# Patient Record
Sex: MALE | Race: WHITE | Employment: OTHER | URBAN - METROPOLITAN AREA
[De-identification: names, ages, dates, MRNs, and addresses within clinical notes are randomized per-mention and may not be internally consistent; named-entity substitution may affect disease eponyms.]

---

## 2017-03-01 ENCOUNTER — OFFICE VISIT (OUTPATIENT)
Dept: ORTHOPEDIC SURGERY | Age: 67
End: 2017-03-01

## 2017-03-01 VITALS — BODY MASS INDEX: 28 KG/M2 | HEIGHT: 71 IN | WEIGHT: 200 LBS | RESPIRATION RATE: 16 BRPM

## 2017-03-01 DIAGNOSIS — M65.341 TRIGGER RING FINGER OF RIGHT HAND: Primary | ICD-10-CM

## 2017-03-01 PROCEDURE — 99214 OFFICE O/P EST MOD 30 MIN: CPT | Performed by: ORTHOPAEDIC SURGERY

## 2017-03-01 PROCEDURE — 20550 NJX 1 TENDON SHEATH/LIGAMENT: CPT | Performed by: ORTHOPAEDIC SURGERY

## 2017-03-01 RX ORDER — NAPROXEN 500 MG/1
500 TABLET ORAL 2 TIMES DAILY WITH MEALS
COMMUNITY
End: 2019-08-20

## 2017-03-01 RX ORDER — LIDOCAINE 50 MG/G
OINTMENT TOPICAL PRN
COMMUNITY

## 2017-03-01 RX ORDER — FINASTERIDE 5 MG/1
5 TABLET, FILM COATED ORAL DAILY
COMMUNITY

## 2017-03-01 RX ORDER — HYDROXYZINE PAMOATE 25 MG/1
25 CAPSULE ORAL 3 TIMES DAILY PRN
COMMUNITY

## 2017-04-03 ENCOUNTER — HOSPITAL ENCOUNTER (OUTPATIENT)
Dept: CT IMAGING | Facility: MEDICAL CENTER | Age: 67
Discharge: HOME | End: 2017-04-03
Attending: NEUROLOGICAL SURGERY | Admitting: NEUROLOGICAL SURGERY

## 2017-04-03 DIAGNOSIS — Q67.5: ICD-10-CM

## 2017-04-03 DIAGNOSIS — Q67.5 CONGENITAL DEFORMITY OF SPINE: ICD-10-CM

## 2019-08-20 ENCOUNTER — OFFICE VISIT (OUTPATIENT)
Dept: ORTHOPEDIC SURGERY | Age: 69
End: 2019-08-20
Payer: COMMERCIAL

## 2019-08-20 VITALS — BODY MASS INDEX: 28.7 KG/M2 | RESPIRATION RATE: 16 BRPM | HEIGHT: 71 IN | WEIGHT: 205 LBS

## 2019-08-20 DIAGNOSIS — M65.341 TRIGGER RING FINGER OF RIGHT HAND: Primary | ICD-10-CM

## 2019-08-20 PROCEDURE — 99213 OFFICE O/P EST LOW 20 MIN: CPT | Performed by: ORTHOPAEDIC SURGERY

## 2019-08-20 PROCEDURE — 20550 NJX 1 TENDON SHEATH/LIGAMENT: CPT | Performed by: ORTHOPAEDIC SURGERY

## 2019-08-20 RX ORDER — NYSTATIN 100000 U/G
CREAM TOPICAL 2 TIMES DAILY
COMMUNITY

## 2019-08-20 RX ORDER — MIRTAZAPINE 15 MG/1
15 TABLET, FILM COATED ORAL NIGHTLY
COMMUNITY

## 2019-08-20 RX ORDER — ATORVASTATIN CALCIUM 40 MG/1
40 TABLET, FILM COATED ORAL DAILY
COMMUNITY

## 2019-08-20 RX ORDER — TRIAMCINOLONE ACETONIDE 1 MG/G
CREAM TOPICAL 2 TIMES DAILY
COMMUNITY
End: 2020-06-12

## 2019-08-20 RX ORDER — OXYCODONE HYDROCHLORIDE AND ACETAMINOPHEN 5; 325 MG/1; MG/1
1 TABLET ORAL EVERY 4 HOURS PRN
COMMUNITY
End: 2020-06-12

## 2019-08-20 RX ORDER — LISINOPRIL 40 MG/1
40 TABLET ORAL DAILY
COMMUNITY

## 2019-08-20 RX ORDER — FERROUS SULFATE 325(65) MG
325 TABLET ORAL
COMMUNITY

## 2019-08-20 RX ORDER — M-VIT,TX,IRON,MINS/CALC/FOLIC 27MG-0.4MG
1 TABLET ORAL DAILY
COMMUNITY

## 2019-08-20 NOTE — PROGRESS NOTES
Patient returns to the office for evaluation of   Chief Complaint   Patient presents with    Finger Pain     right ring finger snapping, clicking and locking, pain   I last examined this patient 2+ years ago at which time I injected the right ring finger for treatment of trigger finger. She obtained prompt and complete relief of all symptoms. Unfortunately, the condition has recurred and the patient returns to the office requesting additional treatment. She complains of pain, swelling, catching and stiffness of the digit for the past 3 months. Symptoms have become worse recently. The patient's social history, past medical history, family history, medications, allergies and review of systems have been reviewed, and dated 8/20/19 and are recorded in the chart. On examination there is mild soft tissue swelling of the digit. There is no deformity. There is tenderness, thickening and nodularity at the base of the flexor tendon sheath. Range of motion is slightly limited in flexion and extension. The digit sticks in flexion and pops into extension, accompanied by some pain. Skin is intact without lesions. Distal circulation and sensation are intact. Muscle strength and coordination are normal.  Reflexes and present bilaterally. Joints are stable. There are no subcutaneous nodules or enlarged epitrochlear lymph nodes. Impression: Recurrent right ring finger trigger digit. The nature of this medical problem is fully discussed with the patient, including all treatment options. All questions are answered. The right  hand is prepared with Betadine and alcohol and the flexor tendon sheath of the right ring finger is injected with 1/2 milliliter of 1% lidocaine and 20 mg.of triamcinalone, with good filling. The patient is advised regarding the expected response and possible reactions from the injection. The patient is asked to call me if full, painless function has not returned within 4 weeks.   The

## 2019-09-23 ENCOUNTER — TELEPHONE (OUTPATIENT)
Dept: ORTHOPEDIC SURGERY | Age: 69
End: 2019-09-23

## 2020-06-12 ENCOUNTER — HOSPITAL ENCOUNTER (EMERGENCY)
Age: 70
Discharge: HOME | End: 2020-06-12
Attending: EMERGENCY MEDICINE
Payer: COMMERCIAL

## 2020-06-12 VITALS
SYSTOLIC BLOOD PRESSURE: 197 MMHG | DIASTOLIC BLOOD PRESSURE: 92 MMHG | TEMPERATURE: 98.1 F | HEIGHT: 71 IN | HEART RATE: 71 BPM | BODY MASS INDEX: 28 KG/M2 | RESPIRATION RATE: 16 BRPM | WEIGHT: 200 LBS | OXYGEN SATURATION: 96 %

## 2020-06-12 LAB
A/G RATIO: 1.6 (ref 1.1–2.2)
ALBUMIN SERPL-MCNC: 4.6 G/DL (ref 3.4–5)
ALP BLD-CCNC: 119 U/L (ref 40–129)
ALT SERPL-CCNC: 15 U/L (ref 10–40)
ANION GAP SERPL CALCULATED.3IONS-SCNC: 10 MMOL/L (ref 3–16)
AST SERPL-CCNC: 16 U/L (ref 15–37)
BASOPHILS ABSOLUTE: 0 K/UL (ref 0–0.2)
BASOPHILS RELATIVE PERCENT: 0.7 %
BILIRUB SERPL-MCNC: 0.3 MG/DL (ref 0–1)
BILIRUBIN URINE: NEGATIVE
BLOOD, URINE: NEGATIVE
BUN BLDV-MCNC: 12 MG/DL (ref 7–20)
CALCIUM SERPL-MCNC: 9.2 MG/DL (ref 8.3–10.6)
CHLORIDE BLD-SCNC: 99 MMOL/L (ref 99–110)
CLARITY: CLEAR
CO2: 28 MMOL/L (ref 21–32)
COLOR: YELLOW
CREAT SERPL-MCNC: 0.8 MG/DL (ref 0.8–1.3)
EOSINOPHILS ABSOLUTE: 0.3 K/UL (ref 0–0.6)
EOSINOPHILS RELATIVE PERCENT: 4.1 %
GFR AFRICAN AMERICAN: >60
GFR NON-AFRICAN AMERICAN: >60
GLOBULIN: 2.9 G/DL
GLUCOSE BLD-MCNC: 88 MG/DL (ref 70–99)
GLUCOSE URINE: NEGATIVE MG/DL
HCT VFR BLD CALC: 39.5 % (ref 40.5–52.5)
HEMOGLOBIN: 13.4 G/DL (ref 13.5–17.5)
KETONES, URINE: NEGATIVE MG/DL
LEUKOCYTE ESTERASE, URINE: NEGATIVE
LYMPHOCYTES ABSOLUTE: 1.8 K/UL (ref 1–5.1)
LYMPHOCYTES RELATIVE PERCENT: 27.3 %
MCH RBC QN AUTO: 30.4 PG (ref 26–34)
MCHC RBC AUTO-ENTMCNC: 33.8 G/DL (ref 31–36)
MCV RBC AUTO: 89.9 FL (ref 80–100)
MICROSCOPIC EXAMINATION: NORMAL
MONOCYTES ABSOLUTE: 0.5 K/UL (ref 0–1.3)
MONOCYTES RELATIVE PERCENT: 7.4 %
NEUTROPHILS ABSOLUTE: 4 K/UL (ref 1.7–7.7)
NEUTROPHILS RELATIVE PERCENT: 60.5 %
NITRITE, URINE: NEGATIVE
PDW BLD-RTO: 12.7 % (ref 12.4–15.4)
PH UA: 6.5 (ref 5–8)
PLATELET # BLD: 209 K/UL (ref 135–450)
PMV BLD AUTO: 7.6 FL (ref 5–10.5)
POTASSIUM REFLEX MAGNESIUM: 4.2 MMOL/L (ref 3.5–5.1)
PROTEIN UA: NEGATIVE MG/DL
RBC # BLD: 4.39 M/UL (ref 4.2–5.9)
SODIUM BLD-SCNC: 137 MMOL/L (ref 136–145)
SPECIFIC GRAVITY UA: 1.01 (ref 1–1.03)
TOTAL PROTEIN: 7.5 G/DL (ref 6.4–8.2)
TROPONIN: <0.01 NG/ML
URINE REFLEX TO CULTURE: NORMAL
URINE TYPE: NORMAL
UROBILINOGEN, URINE: 0.2 E.U./DL
WBC # BLD: 6.7 K/UL (ref 4–11)

## 2020-06-12 PROCEDURE — 6370000000 HC RX 637 (ALT 250 FOR IP): Performed by: EMERGENCY MEDICINE

## 2020-06-12 PROCEDURE — 99283 EMERGENCY DEPT VISIT LOW MDM: CPT

## 2020-06-12 PROCEDURE — 80053 COMPREHEN METABOLIC PANEL: CPT

## 2020-06-12 PROCEDURE — 84484 ASSAY OF TROPONIN QUANT: CPT

## 2020-06-12 PROCEDURE — 6360000002 HC RX W HCPCS: Performed by: EMERGENCY MEDICINE

## 2020-06-12 PROCEDURE — 85025 COMPLETE CBC W/AUTO DIFF WBC: CPT

## 2020-06-12 PROCEDURE — 36415 COLL VENOUS BLD VENIPUNCTURE: CPT

## 2020-06-12 PROCEDURE — 96374 THER/PROPH/DIAG INJ IV PUSH: CPT

## 2020-06-12 PROCEDURE — 81003 URINALYSIS AUTO W/O SCOPE: CPT

## 2020-06-12 RX ORDER — LORAZEPAM 2 MG/ML
0.5 INJECTION INTRAMUSCULAR ONCE
Status: COMPLETED | OUTPATIENT
Start: 2020-06-12 | End: 2020-06-12

## 2020-06-12 RX ORDER — HYDROXYZINE PAMOATE 25 MG/1
25 CAPSULE ORAL ONCE
Status: COMPLETED | OUTPATIENT
Start: 2020-06-12 | End: 2020-06-12

## 2020-06-12 RX ADMIN — LORAZEPAM 0.5 MG: 2 INJECTION INTRAMUSCULAR; INTRAVENOUS at 16:44

## 2020-06-12 RX ADMIN — HYDROXYZINE PAMOATE 25 MG: 25 CAPSULE ORAL at 16:07

## 2020-06-12 ASSESSMENT — ENCOUNTER SYMPTOMS
DIARRHEA: 0
BACK PAIN: 1
SHORTNESS OF BREATH: 0
COUGH: 0
CONSTIPATION: 0
NAUSEA: 0
ABDOMINAL PAIN: 0
CHEST TIGHTNESS: 0
VOMITING: 0

## 2020-06-12 ASSESSMENT — PAIN DESCRIPTION - FREQUENCY: FREQUENCY: CONTINUOUS

## 2020-06-12 ASSESSMENT — PAIN DESCRIPTION - LOCATION: LOCATION: LEG

## 2020-06-12 ASSESSMENT — PAIN DESCRIPTION - PAIN TYPE: TYPE: CHRONIC PAIN

## 2020-06-12 ASSESSMENT — PAIN DESCRIPTION - ORIENTATION: ORIENTATION: LEFT;OTHER (COMMENT)

## 2020-06-12 ASSESSMENT — PAIN DESCRIPTION - DESCRIPTORS: DESCRIPTORS: CONSTANT;ACHING

## 2020-06-12 ASSESSMENT — PAIN SCALES - GENERAL: PAINLEVEL_OUTOF10: 6

## 2020-06-12 NOTE — ED PROVIDER NOTES
[address]      Pt Name: Tripp Hussein  MRN: [id number]  [place] 1950  Date of evaluation: 6/12/2020  Thuy Serrano MD    CHIEF COMPLAINT       Chief Complaint   Patient presents with    Anxiety     pt states he has been feeling jittery/anxiousness for past week. Unable to get it to go away. Has had this issue before per wife, much worse this past week, no known cause, she further states he has been irritable, days and nights seem mixed up. states tired goes to bed cant sleep, jittery with nightmares at times. Seems sleep more on couch than anywhere. Chronic pain has been increased lately. Wears a medication patch this irritates skin. [facility]          HISTORY OF PRESENT ILLNESS   (Location/Symptom, Timing/Onset,Context/Setting, Quality, Duration, Modifying Factors, Severity)  Note limiting factors. Tripp Hussein is a 79 y.o. male with hx of CVA, DM, HTN, HLD who presents to the emergency department for anxiety. States he feels like 'inside wants to crawl out.' that there are 'nerves inside my body/under my skin.'  Its been going on for x 1 week, and is there throughout the day, but worsens at night when he's trying to sleep. Has  been trying to get into the [facility] for an appointment but has been unable to do so and came here. -Denies cp, fever, sob, n/v, blurry vision, weight loss, loss of appetite, new stressors in life, positive symptoms, suicidal homicidal ideation. .   -State he's had previous similar episdoees for several years, but usually only lasts for ~a day or so. HPI    Nursing Notes were reviewed. REVIEW OF SYSTEMS    (2-9 systems for level 4, 10 or more for level 5)     Review of Systems   Constitutional: Negative for activity change, appetite change, chills, diaphoresis and fever. Respiratory: Negative for cough, chest tightness and shortness of breath. Cardiovascular: Negative for chest pain and palpitations. Vitamins-Minerals (THERAPEUTIC MULTIVITAMIN-MINERALS) tablet Take 1 tablet by mouth dailyHistorical Med      nystatin (MYCOSTATIN) 460113 UNIT/GM cream Apply topically 2 times daily Apply topically 2 times daily. , Topical, 2 TIMES DAILY, Historical Med      lidocaine (XYLOCAINE) 5 % ointment Apply topically as needed for Pain Apply topically as needed., Topical, PRN, Until Discontinued, Historical Med      finasteride (PROSCAR) 5 MG tablet Take 5 mg by mouth dailyHistorical Med      BUPROPION HCL PO Take 300 mg by mouth daily Historical Med      pregabalin (LYRICA) 150 MG capsule Take 150 mg by mouth 2 times daily. 300 mg atHistorical Med      oxybutynin (DITROPAN-XL) 10 MG CR tablet Take 20 mg by mouth daily Historical Med      lubiprostone (AMITIZA) 24 MCG capsule Take 1 capsule by mouth 2 times daily (with meals). , Disp-14 capsule, R-0      latanoprost (XALATAN) 0.005 % ophthalmic solution Place 1 drop into both eyes nightly. prazosin (MINIPRESS) 2 MG capsule Take 8 mg by mouth nightly       metformin (GLUCOPHAGE) 500 MG tablet Take 1,000 mg by mouth 2 times daily (with meals) Historical Med      omeprazole (PRILOSEC) 20 MG capsule Take 40 mg by mouth 2 times daily. alprostadil (MUSE) 1000 MCG pellet 1,000 mcg by Transurethral route as needed for Erectile Dysfunction use no more than 3 times per weekHistorical Med      hydrOXYzine (VISTARIL) 25 MG capsule Take 25 mg by mouth 3 times daily as needed for ItchingHistorical Med             ALLERGIES     Patient has no known allergies.     FAMILY HISTORY       Family History   Problem Relation Age of Onset    Cancer Mother     Heart Disease Father     Cancer Sister         breast    Cancer Sister           SOCIAL HISTORY       Social History     Socioeconomic History    Marital status:      Spouse name: None    Number of children: None    Years of education: None    Highest education level: None   Occupational History    None   [facility]  [address] [place] [address]. [facility] [employer]    Phone [phone]   COMPREHENSIVE METABOLIC PANEL W/ REFLEX TO MG FOR LOW K    Narrative:     Performed at:  [facility]. [facility]  [address] [place] [address]. [facility] [employer]    Phone [phone]   TROPONIN    Narrative:     Performed at:  [facility]. [facility]  [address] [place] [address]. [facility] [employer]    Phone [phone]   URINE RT REFLEX TO CULTURE    Narrative:     Performed at:  [facility]. [facility]  [address] [place] [address]. [facility] [employer]    Phone [phone]       All other labs were within normal range ornot returned as of this dictation. EMERGENCY DEPARTMENT COURSE and DIFFERENTIAL DIAGNOSIS/MDM:   Vitals:    Vitals:    06/12/20 1613 06/12/20 1631 06/12/20 1721 06/12/20 1735   BP: (!) 203/105 (!) 208/102 (!) 198/90 (!) 197/92   Pulse: 65 66  71   Resp: 16 16  16   Temp:       TempSrc:       SpO2: 95% 98%  96%   Weight:       Height:             MDM    ED COURSE/MDM    -Leory Kawasaki is a 79 y.o. male with a history of CVA, diabetes, hypertension, hyperlipidemia presents to ED for feelings of jitteriness, anxiety, difficulty sleepingx 1 week. -Upon arrival patient afebrile with beta blood pressure of 211/106. Patient states his blood pressure is \"all over the place\" and is not unusual for her to be elevated like that. Confirms that he has been compliant with his medications.  -Patient seen and evaluated. Oldrecords reviewed. Lab workup was wnl, no acute findings  -She was given Atarax, on reassessment 10 nurse told the patient that he is feeling better though he thinks that he was already starting to feel better anyway. On my reassessment patient states that he is still feeling that jittery and nervous sensation. Was then given 0.5mg Ativan.   Again on reevaluation, patient states that he is feeling improved, is currently

## 2020-07-01 ENCOUNTER — OFFICE VISIT (OUTPATIENT)
Dept: ORTHOPEDIC SURGERY | Age: 70
End: 2020-07-01
Payer: COMMERCIAL

## 2020-07-01 VITALS — HEIGHT: 71 IN | WEIGHT: 199.96 LBS | BODY MASS INDEX: 27.99 KG/M2 | TEMPERATURE: 97.5 F

## 2020-07-01 PROCEDURE — 20550 NJX 1 TENDON SHEATH/LIGAMENT: CPT | Performed by: ORTHOPAEDIC SURGERY

## 2020-07-01 PROCEDURE — 99213 OFFICE O/P EST LOW 20 MIN: CPT | Performed by: ORTHOPAEDIC SURGERY

## 2020-07-01 RX ORDER — LIDOCAINE HYDROCHLORIDE 10 MG/ML
0.5 INJECTION, SOLUTION INFILTRATION; PERINEURAL ONCE
Status: COMPLETED | OUTPATIENT
Start: 2020-07-01 | End: 2020-07-01

## 2020-07-01 RX ORDER — TRIAMCINOLONE ACETONIDE 40 MG/ML
20 INJECTION, SUSPENSION INTRA-ARTICULAR; INTRAMUSCULAR ONCE
Status: COMPLETED | OUTPATIENT
Start: 2020-07-01 | End: 2020-07-01

## 2020-07-01 RX ADMIN — TRIAMCINOLONE ACETONIDE 20 MG: 40 INJECTION, SUSPENSION INTRA-ARTICULAR; INTRAMUSCULAR at 10:55

## 2020-07-01 RX ADMIN — LIDOCAINE HYDROCHLORIDE 0.5 ML: 10 INJECTION, SOLUTION INFILTRATION; PERINEURAL at 10:55

## 2020-07-07 ENCOUNTER — HOSPITAL ENCOUNTER (INPATIENT)
Age: 70
LOS: 2 days | Discharge: HOME | DRG: 684 | End: 2020-07-09
Attending: EMERGENCY MEDICINE | Admitting: INTERNAL MEDICINE
Payer: COMMERCIAL

## 2020-07-07 ENCOUNTER — APPOINTMENT (OUTPATIENT)
Dept: CT IMAGING | Age: 70
DRG: 684 | End: 2020-07-07
Payer: COMMERCIAL

## 2020-07-07 PROBLEM — N17.9 AKI (ACUTE KIDNEY INJURY) (HCC): Status: ACTIVE | Noted: 2020-07-07

## 2020-07-07 LAB
A/G RATIO: 1.6 (ref 1.1–2.2)
ALBUMIN SERPL-MCNC: 4.3 G/DL (ref 3.4–5)
ALP BLD-CCNC: 124 U/L (ref 40–129)
ALT SERPL-CCNC: 21 U/L (ref 10–40)
ANION GAP SERPL CALCULATED.3IONS-SCNC: 14 MMOL/L (ref 3–16)
AST SERPL-CCNC: 16 U/L (ref 15–37)
BASOPHILS ABSOLUTE: 0 K/UL (ref 0–0.2)
BASOPHILS RELATIVE PERCENT: 0.3 %
BILIRUB SERPL-MCNC: 0.3 MG/DL (ref 0–1)
BILIRUBIN URINE: NEGATIVE
BLOOD, URINE: NEGATIVE
BUN BLDV-MCNC: 34 MG/DL (ref 7–20)
CALCIUM SERPL-MCNC: 8.4 MG/DL (ref 8.3–10.6)
CHLORIDE BLD-SCNC: 97 MMOL/L (ref 99–110)
CLARITY: CLEAR
CO2: 21 MMOL/L (ref 21–32)
COLOR: YELLOW
CREAT SERPL-MCNC: 2.4 MG/DL (ref 0.8–1.3)
EOSINOPHILS ABSOLUTE: 0.1 K/UL (ref 0–0.6)
EOSINOPHILS RELATIVE PERCENT: 2.4 %
GFR AFRICAN AMERICAN: 33
GFR NON-AFRICAN AMERICAN: 27
GLOBULIN: 2.7 G/DL
GLUCOSE BLD-MCNC: 195 MG/DL (ref 70–99)
GLUCOSE URINE: NEGATIVE MG/DL
HCT VFR BLD CALC: 41.9 % (ref 40.5–52.5)
HEMOGLOBIN: 13.9 G/DL (ref 13.5–17.5)
KETONES, URINE: NEGATIVE MG/DL
LEUKOCYTE ESTERASE, URINE: NEGATIVE
LYMPHOCYTES ABSOLUTE: 0.7 K/UL (ref 1–5.1)
LYMPHOCYTES RELATIVE PERCENT: 12.4 %
MCH RBC QN AUTO: 30.2 PG (ref 26–34)
MCHC RBC AUTO-ENTMCNC: 33.1 G/DL (ref 31–36)
MCV RBC AUTO: 91.3 FL (ref 80–100)
MICROSCOPIC EXAMINATION: NORMAL
MONOCYTES ABSOLUTE: 0.5 K/UL (ref 0–1.3)
MONOCYTES RELATIVE PERCENT: 9.1 %
NEUTROPHILS ABSOLUTE: 4.1 K/UL (ref 1.7–7.7)
NEUTROPHILS RELATIVE PERCENT: 75.8 %
NITRITE, URINE: NEGATIVE
PDW BLD-RTO: 13 % (ref 12.4–15.4)
PH UA: 5.5 (ref 5–8)
PLATELET # BLD: 176 K/UL (ref 135–450)
PMV BLD AUTO: 8 FL (ref 5–10.5)
POTASSIUM SERPL-SCNC: 4.2 MMOL/L (ref 3.5–5.1)
PROTEIN UA: NEGATIVE MG/DL
RBC # BLD: 4.59 M/UL (ref 4.2–5.9)
SODIUM BLD-SCNC: 132 MMOL/L (ref 136–145)
SPECIFIC GRAVITY UA: 1.01 (ref 1–1.03)
TOTAL PROTEIN: 7 G/DL (ref 6.4–8.2)
URINE TYPE: NORMAL
UROBILINOGEN, URINE: 0.2 E.U./DL
WBC # BLD: 5.5 K/UL (ref 4–11)

## 2020-07-07 PROCEDURE — G0378 HOSPITAL OBSERVATION PER HR: HCPCS

## 2020-07-07 PROCEDURE — 1200000000 HC SEMI PRIVATE

## 2020-07-07 PROCEDURE — 36415 COLL VENOUS BLD VENIPUNCTURE: CPT

## 2020-07-07 PROCEDURE — 93005 ELECTROCARDIOGRAM TRACING: CPT | Performed by: EMERGENCY MEDICINE

## 2020-07-07 PROCEDURE — 80053 COMPREHEN METABOLIC PANEL: CPT

## 2020-07-07 PROCEDURE — 74176 CT ABD & PELVIS W/O CONTRAST: CPT

## 2020-07-07 PROCEDURE — 81003 URINALYSIS AUTO W/O SCOPE: CPT

## 2020-07-07 PROCEDURE — 94761 N-INVAS EAR/PLS OXIMETRY MLT: CPT

## 2020-07-07 PROCEDURE — 99285 EMERGENCY DEPT VISIT HI MDM: CPT

## 2020-07-07 PROCEDURE — 96361 HYDRATE IV INFUSION ADD-ON: CPT

## 2020-07-07 PROCEDURE — 2580000003 HC RX 258: Performed by: EMERGENCY MEDICINE

## 2020-07-07 PROCEDURE — 85025 COMPLETE CBC W/AUTO DIFF WBC: CPT

## 2020-07-07 PROCEDURE — 2700000000 HC OXYGEN THERAPY PER DAY

## 2020-07-07 PROCEDURE — 96360 HYDRATION IV INFUSION INIT: CPT

## 2020-07-07 RX ORDER — 0.9 % SODIUM CHLORIDE 0.9 %
1000 INTRAVENOUS SOLUTION INTRAVENOUS ONCE
Status: COMPLETED | OUTPATIENT
Start: 2020-07-07 | End: 2020-07-08

## 2020-07-07 RX ORDER — HYDROCHLOROTHIAZIDE 25 MG/1
25 TABLET ORAL DAILY
COMMUNITY

## 2020-07-07 RX ORDER — SODIUM CHLORIDE 9 MG/ML
1000 INJECTION, SOLUTION INTRAVENOUS CONTINUOUS
Status: DISCONTINUED | OUTPATIENT
Start: 2020-07-07 | End: 2020-07-08

## 2020-07-07 RX ORDER — 0.9 % SODIUM CHLORIDE 0.9 %
1000 INTRAVENOUS SOLUTION INTRAVENOUS ONCE
Status: COMPLETED | OUTPATIENT
Start: 2020-07-07 | End: 2020-07-07

## 2020-07-07 RX ORDER — GABAPENTIN 300 MG/1
600 CAPSULE ORAL 3 TIMES DAILY
COMMUNITY

## 2020-07-07 RX ADMIN — SODIUM CHLORIDE 1000 ML: 9 INJECTION, SOLUTION INTRAVENOUS at 20:53

## 2020-07-07 RX ADMIN — SODIUM CHLORIDE 1000 ML: 9 INJECTION, SOLUTION INTRAVENOUS at 18:38

## 2020-07-07 ASSESSMENT — PAIN DESCRIPTION - DESCRIPTORS: DESCRIPTORS: ACHING

## 2020-07-07 ASSESSMENT — PAIN DESCRIPTION - PAIN TYPE: TYPE: ACUTE PAIN

## 2020-07-07 ASSESSMENT — PAIN DESCRIPTION - LOCATION: LOCATION: ABDOMEN;LEG

## 2020-07-07 ASSESSMENT — PAIN SCALES - GENERAL: PAINLEVEL_OUTOF10: 5

## 2020-07-07 NOTE — ED NOTES
Ns 1 l started. Pt stood at side of bed attempted to urinate. Pt unable. was lightheaded. bp down to 89 systolic.  Returned to bed. layed flat     Troy Torres RN  07/07/20 Kristian Fay RN  07/07/20 [id number]

## 2020-07-08 ENCOUNTER — APPOINTMENT (OUTPATIENT)
Dept: GENERAL RADIOLOGY | Age: 70
DRG: 684 | End: 2020-07-08
Payer: COMMERCIAL

## 2020-07-08 LAB
ALBUMIN SERPL-MCNC: 3.7 G/DL (ref 3.4–5)
ANION GAP SERPL CALCULATED.3IONS-SCNC: 11 MMOL/L (ref 3–16)
BASOPHILS ABSOLUTE: 0 K/UL (ref 0–0.2)
BASOPHILS RELATIVE PERCENT: 0.4 %
BUN BLDV-MCNC: 26 MG/DL (ref 7–20)
CALCIUM SERPL-MCNC: 8 MG/DL (ref 8.3–10.6)
CHLORIDE BLD-SCNC: 102 MMOL/L (ref 99–110)
CO2: 21 MMOL/L (ref 21–32)
CREAT SERPL-MCNC: 1.4 MG/DL (ref 0.8–1.3)
EKG ATRIAL RATE: 84 BPM
EKG DIAGNOSIS: NORMAL
EKG P AXIS: 22 DEGREES
EKG P-R INTERVAL: 192 MS
EKG Q-T INTERVAL: 364 MS
EKG QRS DURATION: 104 MS
EKG QTC CALCULATION (BAZETT): 430 MS
EKG R AXIS: 15 DEGREES
EKG T AXIS: 20 DEGREES
EKG VENTRICULAR RATE: 84 BPM
EOSINOPHILS ABSOLUTE: 0.1 K/UL (ref 0–0.6)
EOSINOPHILS RELATIVE PERCENT: 1.1 %
ESTIMATED AVERAGE GLUCOSE: 148.5 MG/DL
GFR AFRICAN AMERICAN: >60
GFR NON-AFRICAN AMERICAN: 50
GLUCOSE BLD-MCNC: 100 MG/DL (ref 70–99)
GLUCOSE BLD-MCNC: 103 MG/DL (ref 70–99)
GLUCOSE BLD-MCNC: 110 MG/DL (ref 70–99)
GLUCOSE BLD-MCNC: 209 MG/DL (ref 70–99)
GLUCOSE BLD-MCNC: 82 MG/DL (ref 70–99)
HBA1C MFR BLD: 6.8 %
HCT VFR BLD CALC: 38.6 % (ref 40.5–52.5)
HEMOGLOBIN: 12.9 G/DL (ref 13.5–17.5)
LACTIC ACID: 1.2 MMOL/L (ref 0.4–2)
LYMPHOCYTES ABSOLUTE: 0.8 K/UL (ref 1–5.1)
LYMPHOCYTES RELATIVE PERCENT: 17.2 %
MCH RBC QN AUTO: 30.6 PG (ref 26–34)
MCHC RBC AUTO-ENTMCNC: 33.4 G/DL (ref 31–36)
MCV RBC AUTO: 91.6 FL (ref 80–100)
MONOCYTES ABSOLUTE: 0.6 K/UL (ref 0–1.3)
MONOCYTES RELATIVE PERCENT: 11.2 %
NEUTROPHILS ABSOLUTE: 3.4 K/UL (ref 1.7–7.7)
NEUTROPHILS RELATIVE PERCENT: 70.1 %
PDW BLD-RTO: 12.9 % (ref 12.4–15.4)
PERFORMED ON: ABNORMAL
PERFORMED ON: NORMAL
PHOSPHORUS: 2.5 MG/DL (ref 2.5–4.9)
PLATELET # BLD: 160 K/UL (ref 135–450)
PMV BLD AUTO: 7.9 FL (ref 5–10.5)
POTASSIUM SERPL-SCNC: 4.2 MMOL/L (ref 3.5–5.1)
PROCALCITONIN: 0.14 NG/ML (ref 0–0.15)
RBC # BLD: 4.22 M/UL (ref 4.2–5.9)
SODIUM BLD-SCNC: 134 MMOL/L (ref 136–145)
WBC # BLD: 4.9 K/UL (ref 4–11)

## 2020-07-08 PROCEDURE — 80069 RENAL FUNCTION PANEL: CPT

## 2020-07-08 PROCEDURE — 6360000002 HC RX W HCPCS: Performed by: INTERNAL MEDICINE

## 2020-07-08 PROCEDURE — 83605 ASSAY OF LACTIC ACID: CPT

## 2020-07-08 PROCEDURE — 96374 THER/PROPH/DIAG INJ IV PUSH: CPT

## 2020-07-08 PROCEDURE — 96376 TX/PRO/DX INJ SAME DRUG ADON: CPT

## 2020-07-08 PROCEDURE — 1200000000 HC SEMI PRIVATE

## 2020-07-08 PROCEDURE — 84145 PROCALCITONIN (PCT): CPT

## 2020-07-08 PROCEDURE — 2580000003 HC RX 258: Performed by: INTERNAL MEDICINE

## 2020-07-08 PROCEDURE — 93010 ELECTROCARDIOGRAM REPORT: CPT | Performed by: INTERNAL MEDICINE

## 2020-07-08 PROCEDURE — 2580000003 HC RX 258: Performed by: EMERGENCY MEDICINE

## 2020-07-08 PROCEDURE — 96361 HYDRATE IV INFUSION ADD-ON: CPT

## 2020-07-08 PROCEDURE — 96372 THER/PROPH/DIAG INJ SC/IM: CPT

## 2020-07-08 PROCEDURE — 6370000000 HC RX 637 (ALT 250 FOR IP): Performed by: INTERNAL MEDICINE

## 2020-07-08 PROCEDURE — 87040 BLOOD CULTURE FOR BACTERIA: CPT

## 2020-07-08 PROCEDURE — G0378 HOSPITAL OBSERVATION PER HR: HCPCS

## 2020-07-08 PROCEDURE — 99223 1ST HOSP IP/OBS HIGH 75: CPT | Performed by: INTERNAL MEDICINE

## 2020-07-08 PROCEDURE — 36415 COLL VENOUS BLD VENIPUNCTURE: CPT

## 2020-07-08 PROCEDURE — 83036 HEMOGLOBIN GLYCOSYLATED A1C: CPT

## 2020-07-08 PROCEDURE — 85025 COMPLETE CBC W/AUTO DIFF WBC: CPT

## 2020-07-08 PROCEDURE — U0003 INFECTIOUS AGENT DETECTION BY NUCLEIC ACID (DNA OR RNA); SEVERE ACUTE RESPIRATORY SYNDROME CORONAVIRUS 2 (SARS-COV-2) (CORONAVIRUS DISEASE [COVID-19]), AMPLIFIED PROBE TECHNIQUE, MAKING USE OF HIGH THROUGHPUT TECHNOLOGIES AS DESCRIBED BY CMS-2020-01-R: HCPCS

## 2020-07-08 PROCEDURE — 71045 X-RAY EXAM CHEST 1 VIEW: CPT

## 2020-07-08 RX ORDER — MORPHINE SULFATE 2 MG/ML
2 INJECTION, SOLUTION INTRAMUSCULAR; INTRAVENOUS EVERY 4 HOURS PRN
Status: DISCONTINUED | OUTPATIENT
Start: 2020-07-08 | End: 2020-07-09 | Stop reason: HOSPADM

## 2020-07-08 RX ORDER — FINASTERIDE 5 MG/1
5 TABLET, FILM COATED ORAL DAILY
Status: DISCONTINUED | OUTPATIENT
Start: 2020-07-08 | End: 2020-07-09 | Stop reason: HOSPADM

## 2020-07-08 RX ORDER — ASPIRIN 81 MG/1
81 TABLET, CHEWABLE ORAL DAILY
Status: DISCONTINUED | OUTPATIENT
Start: 2020-07-08 | End: 2020-07-09 | Stop reason: HOSPADM

## 2020-07-08 RX ORDER — ACETAMINOPHEN 650 MG/1
650 SUPPOSITORY RECTAL EVERY 6 HOURS PRN
Status: DISCONTINUED | OUTPATIENT
Start: 2020-07-08 | End: 2020-07-09 | Stop reason: HOSPADM

## 2020-07-08 RX ORDER — POLYETHYLENE GLYCOL 3350 17 G/17G
17 POWDER, FOR SOLUTION ORAL DAILY PRN
Status: DISCONTINUED | OUTPATIENT
Start: 2020-07-08 | End: 2020-07-09 | Stop reason: HOSPADM

## 2020-07-08 RX ORDER — MIRTAZAPINE 15 MG/1
15 TABLET, FILM COATED ORAL NIGHTLY
Status: DISCONTINUED | OUTPATIENT
Start: 2020-07-08 | End: 2020-07-09 | Stop reason: HOSPADM

## 2020-07-08 RX ORDER — ACETAMINOPHEN 325 MG/1
650 TABLET ORAL EVERY 6 HOURS PRN
Status: DISCONTINUED | OUTPATIENT
Start: 2020-07-08 | End: 2020-07-09 | Stop reason: HOSPADM

## 2020-07-08 RX ORDER — SODIUM CHLORIDE 0.9 % (FLUSH) 0.9 %
10 SYRINGE (ML) INJECTION EVERY 12 HOURS SCHEDULED
Status: DISCONTINUED | OUTPATIENT
Start: 2020-07-08 | End: 2020-07-09 | Stop reason: HOSPADM

## 2020-07-08 RX ORDER — PROMETHAZINE HYDROCHLORIDE 25 MG/1
12.5 TABLET ORAL EVERY 6 HOURS PRN
Status: DISCONTINUED | OUTPATIENT
Start: 2020-07-08 | End: 2020-07-09 | Stop reason: HOSPADM

## 2020-07-08 RX ORDER — SODIUM CHLORIDE 9 MG/ML
INJECTION, SOLUTION INTRAVENOUS CONTINUOUS
Status: DISCONTINUED | OUTPATIENT
Start: 2020-07-08 | End: 2020-07-09 | Stop reason: HOSPADM

## 2020-07-08 RX ORDER — ONDANSETRON 2 MG/ML
4 INJECTION INTRAMUSCULAR; INTRAVENOUS EVERY 6 HOURS PRN
Status: DISCONTINUED | OUTPATIENT
Start: 2020-07-08 | End: 2020-07-09 | Stop reason: HOSPADM

## 2020-07-08 RX ORDER — PANTOPRAZOLE SODIUM 40 MG/1
40 TABLET, DELAYED RELEASE ORAL
Status: DISCONTINUED | OUTPATIENT
Start: 2020-07-08 | End: 2020-07-09 | Stop reason: HOSPADM

## 2020-07-08 RX ORDER — OXYBUTYNIN CHLORIDE 5 MG/1
20 TABLET, EXTENDED RELEASE ORAL DAILY
Status: DISCONTINUED | OUTPATIENT
Start: 2020-07-08 | End: 2020-07-09 | Stop reason: HOSPADM

## 2020-07-08 RX ORDER — ATORVASTATIN CALCIUM 40 MG/1
40 TABLET, FILM COATED ORAL DAILY
Status: DISCONTINUED | OUTPATIENT
Start: 2020-07-08 | End: 2020-07-09 | Stop reason: HOSPADM

## 2020-07-08 RX ORDER — LATANOPROST 50 UG/ML
1 SOLUTION/ DROPS OPHTHALMIC NIGHTLY
Status: DISCONTINUED | OUTPATIENT
Start: 2020-07-08 | End: 2020-07-09 | Stop reason: HOSPADM

## 2020-07-08 RX ORDER — SODIUM CHLORIDE 0.9 % (FLUSH) 0.9 %
10 SYRINGE (ML) INJECTION PRN
Status: DISCONTINUED | OUTPATIENT
Start: 2020-07-08 | End: 2020-07-09 | Stop reason: HOSPADM

## 2020-07-08 RX ORDER — DEXTROSE MONOHYDRATE 50 MG/ML
100 INJECTION, SOLUTION INTRAVENOUS PRN
Status: DISCONTINUED | OUTPATIENT
Start: 2020-07-08 | End: 2020-07-09 | Stop reason: HOSPADM

## 2020-07-08 RX ORDER — DEXTROSE MONOHYDRATE 25 G/50ML
12.5 INJECTION, SOLUTION INTRAVENOUS PRN
Status: DISCONTINUED | OUTPATIENT
Start: 2020-07-08 | End: 2020-07-09 | Stop reason: HOSPADM

## 2020-07-08 RX ORDER — NICOTINE POLACRILEX 4 MG
15 LOZENGE BUCCAL PRN
Status: DISCONTINUED | OUTPATIENT
Start: 2020-07-08 | End: 2020-07-09 | Stop reason: HOSPADM

## 2020-07-08 RX ADMIN — SODIUM CHLORIDE: 9 INJECTION, SOLUTION INTRAVENOUS at 19:45

## 2020-07-08 RX ADMIN — MORPHINE SULFATE 2 MG: 2 INJECTION, SOLUTION INTRAMUSCULAR; INTRAVENOUS at 07:51

## 2020-07-08 RX ADMIN — PANTOPRAZOLE SODIUM 40 MG: 40 TABLET, DELAYED RELEASE ORAL at 10:09

## 2020-07-08 RX ADMIN — ASPIRIN 81 MG 81 MG: 81 TABLET ORAL at 10:05

## 2020-07-08 RX ADMIN — ACETAMINOPHEN 650 MG: 325 TABLET ORAL at 23:42

## 2020-07-08 RX ADMIN — Medication 10 ML: at 10:06

## 2020-07-08 RX ADMIN — ENOXAPARIN SODIUM 30 MG: 30 INJECTION SUBCUTANEOUS at 10:06

## 2020-07-08 RX ADMIN — PROMETHAZINE HYDROCHLORIDE 12.5 MG: 25 TABLET ORAL at 10:09

## 2020-07-08 RX ADMIN — INSULIN LISPRO 2 UNITS: 100 INJECTION, SOLUTION INTRAVENOUS; SUBCUTANEOUS at 12:08

## 2020-07-08 RX ADMIN — PREGABALIN 150 MG: 100 CAPSULE ORAL at 10:05

## 2020-07-08 RX ADMIN — PREGABALIN 150 MG: 100 CAPSULE ORAL at 21:20

## 2020-07-08 RX ADMIN — PANTOPRAZOLE SODIUM 40 MG: 40 TABLET, DELAYED RELEASE ORAL at 17:12

## 2020-07-08 RX ADMIN — ATORVASTATIN CALCIUM 40 MG: 40 TABLET, FILM COATED ORAL at 10:05

## 2020-07-08 RX ADMIN — SODIUM CHLORIDE: 9 INJECTION, SOLUTION INTRAVENOUS at 10:09

## 2020-07-08 RX ADMIN — MORPHINE SULFATE 2 MG: 2 INJECTION, SOLUTION INTRAMUSCULAR; INTRAVENOUS at 21:47

## 2020-07-08 RX ADMIN — MIRTAZAPINE 15 MG: 15 TABLET, FILM COATED ORAL at 21:20

## 2020-07-08 RX ADMIN — OXYBUTYNIN CHLORIDE 20 MG: 5 TABLET, EXTENDED RELEASE ORAL at 10:05

## 2020-07-08 RX ADMIN — ACETAMINOPHEN 650 MG: 325 TABLET ORAL at 10:09

## 2020-07-08 RX ADMIN — SODIUM CHLORIDE 1000 ML: 9 INJECTION, SOLUTION INTRAVENOUS at 01:37

## 2020-07-08 RX ADMIN — FINASTERIDE 5 MG: 5 TABLET, FILM COATED ORAL at 10:05

## 2020-07-08 ASSESSMENT — PAIN SCALES - GENERAL
PAINLEVEL_OUTOF10: 4
PAINLEVEL_OUTOF10: 0
PAINLEVEL_OUTOF10: 6
PAINLEVEL_OUTOF10: 6

## 2020-07-08 NOTE — ED NOTES
Repositioned on back b/p 82/44 pulse 76 O2 97% on right side     Facundo Santana, PETER  07/07/20 [id number]

## 2020-07-08 NOTE — ED NOTES
Patient states multiple loose stools yesterday and then feeling weak and worn out today     Leopold Martinez, RN  07/07/20 [id number]

## 2020-07-08 NOTE — PLAN OF CARE
Problem: Falls - Risk of:  Goal: Will remain free from falls  Description: Will remain free from falls  7/8/2020 1105 by Devaughn Saldviar RN  Outcome: Ongoing  7/8/2020 0232 by Bushra Hitchcock RN  Outcome: Ongoing  Goal: Absence of physical injury  Description: Absence of physical injury  7/8/2020 0232 by Bushra Hitchcock RN  Outcome: Ongoing     Problem: SAFETY  Goal: Free from accidental physical injury  7/8/2020 0232 by Bushra Hitchcock RN  Outcome: Ongoing  Goal: Free from intentional harm  7/8/2020 0232 by Bushra Hitchcock RN  Outcome: Ongoing     Problem: DAILY CARE  Goal: Daily care needs are met  7/8/2020 1105 by Devaughn Saldivar RN  Outcome: Ongoing  7/8/2020 0232 by Bushra Hitchcock RN  Outcome: Ongoing     Problem: PAIN  Goal: Patient's pain/discomfort is manageable  7/8/2020 1105 by Devaughn Saldivar RN  Outcome: Ongoing  7/8/2020 0232 by Bushra Hitchcock RN  Outcome: Ongoing     Problem: SKIN INTEGRITY  Goal: Skin integrity is maintained or improved  7/8/2020 0232 by Bushra Hitchcock RN  Outcome: Ongoing     Problem: KNOWLEDGE DEFICIT  Goal: Patient/S.O. demonstrates understanding of disease process, treatment plan, medications, and discharge instructions.   7/8/2020 0232 by Bushra Hitchcock RN  Outcome: Ongoing     Problem: DISCHARGE BARRIERS  Goal: Patient's continuum of care needs are met  7/8/2020 0232 by Bushra Hitchcock RN  Outcome: Ongoing

## 2020-07-08 NOTE — PLAN OF CARE
78 yo M coming from [place] [address] w/ c/o hypotension, N/V, abd pain. He has reportedly had abdominal pain for 2 weeks which has gotten worse over the last 2 days. He had an episode of nausea and vomiting yesterday. He was found to have a creatinine of 2.4 (baseline 0.8). SBP was reportedly 59 at home. Since arrival in the ED has had mildly low diastolic blood pressures. Most of his SBP's have been in the normal range. Sequentially improving with IVF. DORIS, renally dosed medications. Hold hydrochlorothiazide and lisinopril.   Abdominal pain  Nausea and vomiting  DM2  Low DBP

## 2020-07-08 NOTE — PROGRESS NOTES
Shift assessment complete. Febrile, 101.2 orally. Removed blankets. PRN Tylenol given. Pt A/OX4, c/o overall \"crappy\" feeling. Scheduled meds given per orders. See MAR. No vomiting this AM, some nausea. Pt up in chair. Denies needs. Call light within reach. Will continue to monitor.

## 2020-07-08 NOTE — PROGRESS NOTES
Admission questions and assessment complete; see flow sheet. Scheduled medications administered; See MAR. IV flushed without difficulty. Pt denies pain at this time. Pt denies any needs at this time.  Pt educated on use of call light and to call out with needs, verbalized understanding, bed in low locked position for pt safety

## 2020-07-08 NOTE — H&P
Hospital Medicine History & Physical      PCP: No primary care provider on file. Date of Admission: 7/7/2020    Date of Service: Pt seen/examined on 7/8/2020    Chief Complaint:    Chief Complaint   Patient presents with    Hypotension     reports diarrhea and abdominal pain started yesterday. feels weak lightheaded and dizzy today. reports took bp and top number was 57. pt amb to bed 2 without difficulty. bp 109/61. pt pale warm dry. no distress     History Of Present Illness: The patient is a 79 y.o. male with anxiety, arthritis, chronic pain, diabetes, hyperlipidemia, hypertension who presented to [facility] ED with complaint of hypotension, nausea vomiting and diarrhea. His oral intake has been poor. Denies having any fever or chills. Denies any chest pain, shortness of breath or cough. Admitted for acute kidney injury. He developed fevers overnight. Denies cough, shortness of breath or myalgias. Past Medical History:        Diagnosis Date    Anxiety     Arthritis     Back pain, chronic     Chronic neck pain     Diabetes mellitus ([address]     Glaucoma     Hypercholesteremia     Hypertension     Stool culture positive for Clostridium difficile 01/10/2014       Past Surgical History:        Procedure Laterality Date    CARPAL TUNNEL RELEASE Right     CERVICAL FUSION      CHOLECYSTECTOMY      COLONOSCOPY      ENDOSCOPY, COLON, DIAGNOSTIC      HEMORRHOID SURGERY      ROTATOR CUFF REPAIR      SHOULDER ARTHROSCOPY Left 4/24/13    subacromial decompression, rotator cuff repair    THORACIC LAMINECTOMY  07/08/2016    T10-11 laminectomy and placement of spinal stimulator and battery    WISDOM TOOTH EXTRACTION         Medications Prior to Admission:    Prior to Admission medications    Medication Sig Start Date End Date Taking?  Authorizing Provider   hydroCHLOROthiazide (HYDRODIURIL) 25 MG tablet Take 25 mg by mouth daily Only takes 1/2 pill daily   Yes Historical Provider, MD gabapentin (NEURONTIN) 300 MG capsule Take 600 mg by mouth 3 times daily. Yes Historical Provider, MD   aspirin 81 MG tablet Take 81 mg by mouth daily   Yes Historical Provider, MD   atorvastatin (LIPITOR) 40 MG tablet Take 40 mg by mouth daily   Yes Historical Provider, MD   ferrous sulfate 325 (65 Fe) MG tablet Take 325 mg by mouth daily (with breakfast)   Yes Historical Provider, MD   lisinopril (PRINIVIL;ZESTRIL) 40 MG tablet Take 40 mg by mouth daily   Yes Historical Provider, MD   mirtazapine (REMERON) 15 MG tablet Take 15 mg by mouth nightly   Yes Historical Provider, MD   Multiple Vitamins-Minerals (THERAPEUTIC MULTIVITAMIN-MINERALS) tablet Take 1 tablet by mouth daily   Yes Historical Provider, MD   nystatin (MYCOSTATIN) 086501 UNIT/GM cream Apply topically 2 times daily Apply topically 2 times daily. Yes Historical Provider, MD   finasteride (PROSCAR) 5 MG tablet Take 5 mg by mouth daily   Yes Historical Provider, MD   BUPROPION HCL PO Take 300 mg by mouth daily    Yes Historical Provider, MD   pregabalin (LYRICA) 150 MG capsule Take 150 mg by mouth 2 times daily. 300 mg at   Yes Historical Provider, MD   oxybutynin (DITROPAN-XL) 10 MG CR tablet Take 20 mg by mouth daily    Yes Historical Provider, MD   latanoprost (XALATAN) 0.005 % ophthalmic solution Place 1 drop into both eyes nightly. Yes Historical Provider, MD   prazosin (MINIPRESS) 2 MG capsule Take 8 mg by mouth nightly    Yes Historical Provider, MD   metformin (GLUCOPHAGE) 500 MG tablet Take 1,000 mg by mouth 2 times daily (with meals)    Yes Historical Provider, MD   omeprazole (PRILOSEC) 20 MG capsule Take 40 mg by mouth 2 times daily. Yes Historical Provider, MD   alprostadil (MUSE) 1000 MCG pellet 1,000 mcg by Transurethral route as needed for Erectile Dysfunction use no more than 3 times per week    Historical Provider, MD   lidocaine (XYLOCAINE) 5 % ointment Apply topically as needed for Pain Apply topically as needed. Historical Provider, MD   hydrOXYzine (VISTARIL) 25 MG capsule Take 25 mg by mouth 3 times daily as needed for Itching    Historical Provider, MD   lubiprostone (AMITIZA) 24 MCG capsule Take 1 capsule by mouth 2 times daily (with meals). 1/10/14   Arsenio Callejas MD       Allergies:  Patient has no known allergies. Social History:  The patient currently lives at home    TOBACCO:   reports that he quit smoking about 9 years ago. His smoking use included cigarettes. He has a 90.00 pack-year smoking history. He has never used smokeless tobacco.  ETOH:   reports no history of alcohol use. Family History:   Positive as follows:        Problem Relation Age of Onset    Cancer Mother     Heart Disease Father     Cancer Sister         breast    Cancer Sister        REVIEW OF SYSTEMS:       Constitutional: + for fever   HENT: Negative for sore throat   Eyes: Negative for redness   Respiratory: Negative  for dyspnea, cough   Cardiovascular: Negative for chest pain   Gastrointestinal: + for vomiting, diarrhea   Genitourinary: Negative for hematuria   Musculoskeletal: Negative for arthralgias   Skin: Negative for rash   Neurological: Negative for syncope   Hematological: Negative for adenopathy   Psychiatric/Behavorial: Negative for anxiety    PHYSICAL EXAM:    /66   Pulse 84   Temp 100.5 °F (38.1 °C) (Oral)   Resp 18   Ht 5' 11\" (1.803 m)   Wt 192 lb 14.4 oz (87.5 kg)   SpO2 93%   BMI 26.90 kg/m²     Gen: No distress. Alert. Eyes: PERRL. No sclera icterus. No conjunctival injection. ENT: No discharge. Pharynx clear. Neck: No JVD. No Carotid Bruit. Trachea midline. Resp: No accessory muscle use. No crackles. No wheezes. No rhonchi. CV: Regular rate. Regular rhythm. No murmur. No rub. No edema. Capillary Refill: Brisk,< 3 seconds   Peripheral Pulses: +2 palpable, equal bilaterally   GI: Non-tender. Non-distended. No masses. No organomegaly. Normal bowel sounds. No hernia.    Skin: Warm and dry. No nodule on exposed extremities. No rash on exposed extremities. M/S: No cyanosis. No joint deformity. No clubbing. Neuro: Awake. Grossly nonfocal    Psych: Oriented x 3. No anxiety or agitation. CBC:   Recent Labs     07/07/20 1830 07/08/20  0535   WBC 5.5 4.9   HGB 13.9 12.9*   HCT 41.9 38.6*   MCV 91.3 91.6    160     BMP:   Recent Labs     07/07/20 1830 07/08/20  0535   * 134*   K 4.2 4.2   CL 97* 102   CO2 21 21   PHOS  --  2.5   BUN 34* 26*   CREATININE 2.4* 1.4*     LIVER PROFILE:   Recent Labs     07/07/20  1830   AST 16   ALT 21   BILITOT 0.3   ALKPHOS 124     UA:  Recent Labs     07/07/20 2157   COLORU Yellow   PHUR 5.5   CLARITYU Clear   SPECGRAV 1.015   LEUKOCYTESUR Negative   UROBILINOGEN 0.2   BILIRUBINUR Negative   BLOODU Negative   GLUCOSEU Negative     CULTURES  Blood Cx: pending   C. Diff: pending   GI panel: pending     EKG:  I have reviewed the EKG with the following interpretation:   NSR, normal axis, normal interval, no acute ST segment changes concerning for acute ischemia     RADIOLOGY  CT ABDOMEN PELVIS WO CONTRAST Additional Contrast? None   Final Result   No significant findings in the abdomen or pelvis to correlate to the   patient's clinical symptoms. ASSESSMENT/PLAN:  Febrile Illness   - Was afebrile on arrival but developed fevers overnight with Tmax 101.2  - No acute source at this time  - Check stool studies, Lactic acid, PCT, and Blood Cx   UA is negative. Obtain chest x-ray. Do cover testing. Droplet plus precautions. DORIS   Secondary to nausea vomiting diarrhea and volume depletion in the setting of being on hydrochlorothiazide and ACE inhibitor.  - Baseline creatinine ~0.8  - Creatinine on admission 2.4-->1.4   - likely 2/2 GI losses and HCTZ/ACE home medications-->hold   - Continue IVF and monitor   - Nephrology consult     Abdominal Pain   N/V/D  - CT WO contrast shows no acute abnormalities   - Patient does have a hx of c.  Diff, C. Diff precautions  - No leukocytosis on admission   - Add stool studies   - Continue PPI     DM2  - BG elevated on arrival to ED   - Continue SSI  - hold home oral Rx   - Hgb A1c: pending     HLD  - Continue statin     Chronic Back Pain   Scoliosis  - s/p spinal cord stimulator 7/18/16-->had failure of stimulator and removal on 10/28/19  - Continue home Norco and Lyrica, OARRS confirmed     HTN  - BP is controlled   - Hold home HCTZ and ACE 2/2 DORIS   - Monitor     DVT Prophylaxis: Lovenox  Diet: DIET CLEAR LIQUID; Carb Control: 4 carb choices (60 gms)/meal   Code Status: Full Code        YULIA Julien.

## 2020-07-08 NOTE — ED PROVIDER NOTES
Emergency Physician Note    Chief Complaint  Hypotension (reports diarrhea and abdominal pain started yesterday. feels weak lightheaded and dizzy today. reports took bp and top number was 57. pt amb to bed 2 without difficulty. bp 109/61. pt pale warm dry. no distress)       History of Present Illness  Nadine Mariscal is a 79 y.o. male who presents to the ED for nausea, vomiting, diarrhea, lightheadedness, and low blood pressure. Patient states he has had some vague abdominal pain for 2 weeks he could not give me any further descriptors but then starting yesterday he started having nausea, vomiting, diarrhea with more intense abdominal pain that he describes as crampiness. He states this morning the diarrhea is improving as well as the vomiting however he started to feel very lightheaded daily he was also very tired and slept most of the day. He took a blood pressure was found to be very low and the top number was 57. Patient states he feels \"blah\". He thinks he may have had some decreased urine output over the last couple of days. No dysuria, hematuria. No chest pain, no shortness of breath. Denies fever, chills,  chest pain, shortness of breath, cough,   headache, sore throat, dysuria, back pain, rash. No palliative/provocative factors. Unless otherwise stated in this report or unable to obtain because of the patient's clinical or mental status as evidenced by the medical record, this patient's positive and negative responses for review of systems, constitutional, psych, eyes, ENT, cardiovascular, respiratory, gastrointestinal, neurological, genitourinary, musculoskeletal, integument systems and systems related to the presenting problem are either stated in the preceding paragraph or were not pertinent or were negative for the symptoms and/or complaints related to the medical problem.     I have reviewed the following from the nursing documentation:      Prior to Admission medications Medication Sig Start Date End Date Taking? Authorizing Provider   hydroCHLOROthiazide (HYDRODIURIL) 25 MG tablet Take 25 mg by mouth daily Only takes 1/2 pill daily   Yes Historical Provider, MD   aspirin 81 MG tablet Take 81 mg by mouth daily   Yes Historical Provider, MD   atorvastatin (LIPITOR) 40 MG tablet Take 40 mg by mouth daily   Yes Historical Provider, MD   ferrous sulfate 325 (65 Fe) MG tablet Take 325 mg by mouth daily (with breakfast)   Yes Historical Provider, MD   lisinopril (PRINIVIL;ZESTRIL) 40 MG tablet Take 40 mg by mouth daily   Yes Historical Provider, MD   mirtazapine (REMERON) 15 MG tablet Take 15 mg by mouth nightly   Yes Historical Provider, MD   Multiple Vitamins-Minerals (THERAPEUTIC MULTIVITAMIN-MINERALS) tablet Take 1 tablet by mouth daily   Yes Historical Provider, MD   nystatin (MYCOSTATIN) 305661 UNIT/GM cream Apply topically 2 times daily Apply topically 2 times daily. Yes Historical Provider, MD   finasteride (PROSCAR) 5 MG tablet Take 5 mg by mouth daily   Yes Historical Provider, MD   BUPROPION HCL PO Take 300 mg by mouth daily    Yes Historical Provider, MD   pregabalin (LYRICA) 150 MG capsule Take 150 mg by mouth 2 times daily. 300 mg at   Yes Historical Provider, MD   oxybutynin (DITROPAN-XL) 10 MG CR tablet Take 20 mg by mouth daily    Yes Historical Provider, MD   latanoprost (XALATAN) 0.005 % ophthalmic solution Place 1 drop into both eyes nightly. Yes Historical Provider, MD   prazosin (MINIPRESS) 2 MG capsule Take 8 mg by mouth nightly    Yes Historical Provider, MD   metformin (GLUCOPHAGE) 500 MG tablet Take 1,000 mg by mouth 2 times daily (with meals)    Yes Historical Provider, MD   omeprazole (PRILOSEC) 20 MG capsule Take 40 mg by mouth 2 times daily.    Yes Historical Provider, MD   alprostadil (MUSE) 1000 MCG pellet 1,000 mcg by Transurethral route as needed for Erectile Dysfunction use no more than 3 times per week    Historical Provider, MD   lidocaine to quit: 2010     Years since quittin.8    Smokeless tobacco: Never Used   Substance and Sexual Activity    Alcohol use: No     Comment: rarely    Drug use: No    Sexual activity: Not Currently   Lifestyle    Physical activity     Days per week: Not on file     Minutes per session: Not on file    Stress: Not on file   Relationships    Social connections     Talks on phone: Not on file     Gets together: Not on file     Attends Ca[employer] service: Not on file     Active member of club or organization: Not on file     Attends meetings of clubs or organizations: Not on file     Relationship status: Not on file    Intimate partner violence     Fear of current or ex partner: Not on file     Emotionally abused: Not on file     Physically abused: Not on file     Forced sexual activity: Not on file   Other Topics Concern    Not on file   Social History Narrative    Not on file       Nursing notes reviewed. ED Triage Vitals [20 1811]   Enc Vitals Group      /61      Pulse 88      Resp 16      Temp 98.4 °F (36.9 °C)      Temp Source Oral      SpO2 99 %      Weight 195 lb (88.5 kg)      Height 5' 11\" (1.803 m)      Head Circumference       Peak Flow       Pain Score       Pain Loc       Pain Edu? Excl. in [address]? GENERAL:    Awake, alert. Well developed, well nourished with no apparent distress. Nontoxic-appearing, non-ill-appearing. HENT:    Normocephalic, Atraumatic, no lacerations. No ENT exam due to PPE. EYES:    Conjunctiva normal,   Pupils equal round and reactive to light,   Extraocular movements normal.  NECK:    Trachea is midline. No stridor. CHEST:    Regular rate and regular rhythm, no murmurs/rubs/gallops,   normal S1/S2, chest wall non-tender. LUNGS:    No respiratory distress. No abdominal retractions, no sternal retractions. Clear to auscultation bilaterally, no wheezing, no rhochi, no rales  ABDOMEN:    Soft, non-tender, non-distended.    No guarding. No rebound. EXTREMITIES:   Moves extremities x4 with purpose. Normal range of motion, no edema,   no tenderness, no deformity,   distal pulses present and equal bilaterally. SKIN:    Warm, dry and intact. Pallor is present  NEUROLOGIC:  Normal mental status. Moving all extremities to command. Alert and oriented x4   without focal motor deficit or gross sensory deficit. Normal speech. PSYCHIATRIC:  Not anxious,   normal mood and affect,   thoughts are linear and organized,   without delusions/hallucinations,   responds appropriately to questions      LABS and DIAGNOSTIC RESULTS  EKG  The Ekg interpreted by me shows  normal sinus rhythm with a rate of 84  Axis is   Normal  QTc is  within an acceptable range  Intervals and Durations are unremarkable. ST Segments: no acute change and normal  Delta waves, Brugada Syndrome, and Short SC are not present. Prior EKG to compare with was available. No significant changes compared to prior EKG from July 8, 2016      RADIOLOGY  X-RAYS:  I have reviewed radiologic plain film image(s). ALL OTHER NON-PLAIN FILM IMAGES SUCH AS CT, ULTRASOUND AND MRI HAVE BEEN READ BY THE RADIOLOGIST. CT ABDOMEN PELVIS WO CONTRAST Additional Contrast? None   Final Result   No significant findings in the abdomen or pelvis to correlate to the   patient's clinical symptoms.             LABS  Results for orders placed or performed during the hospital encounter of 07/07/20   CBC auto differential   Result Value Ref Range    WBC 5.5 4.0 - 11.0 K/uL    RBC 4.59 4.20 - 5.90 M/uL    Hemoglobin 13.9 13.5 - 17.5 g/dL    Hematocrit 41.9 40.5 - 52.5 %    MCV 91.3 80.0 - 100.0 fL    MCH 30.2 26.0 - 34.0 pg    MCHC 33.1 31.0 - 36.0 g/dL    RDW 13.0 12.4 - 15.4 %    Platelets 049 016 - 696 K/uL    MPV 8.0 5.0 - 10.5 fL    Neutrophils % 75.8 %    Lymphocytes % 12.4 %    Monocytes % 9.1 %    Eosinophils % 2.4 %    Basophils % 0.3 %    Neutrophils Absolute 4.1 1.7 - 7.7 K/uL Lymphocytes Absolute 0.7 (L) 1.0 - 5.1 K/uL    Monocytes Absolute 0.5 0.0 - 1.3 K/uL    Eosinophils Absolute 0.1 0.0 - 0.6 K/uL    Basophils Absolute 0.0 0.0 - 0.2 K/uL   Comprehensive metabolic panel   Result Value Ref Range    Sodium 132 (L) 136 - 145 mmol/L    Potassium 4.2 3.5 - 5.1 mmol/L    Chloride 97 (L) 99 - 110 mmol/L    CO2 21 21 - 32 mmol/L    Anion Gap 14 3 - 16    Glucose 195 (H) 70 - 99 mg/dL    BUN 34 (H) 7 - 20 mg/dL    CREATININE 2.4 (H) 0.8 - 1.3 mg/dL    GFR Non-African American 27 (A) >60    GFR  33 (A) >60    Calcium 8.4 8.3 - 10.6 mg/dL    Total Protein 7.0 6.4 - 8.2 g/dL    Alb 4.3 3.4 - 5.0 g/dL    Albumin/Globulin Ratio 1.6 1.1 - 2.2    Total Bilirubin 0.3 0.0 - 1.0 mg/dL    Alkaline Phosphatase 124 40 - 129 U/L    ALT 21 10 - 40 U/L    AST 16 15 - 37 U/L    Globulin 2.7 g/dL   Urinalysis, reflex to microscopic   Result Value Ref Range    Color, UA Yellow Straw/Yellow    Clarity, UA Clear Clear    Glucose, Ur Negative Negative mg/dL    Bilirubin Urine Negative Negative    Ketones, Urine Negative Negative mg/dL    Specific Gravity, UA 1.015 1.005 - 1.030    Blood, Urine Negative Negative    pH, UA 5.5 5.0 - 8.0    Protein, UA Negative Negative mg/dL    Urobilinogen, Urine 0.2 <2.0 E.U./dL    Nitrite, Urine Negative Negative    Leukocyte Esterase, Urine Negative Negative    Microscopic Examination Not Indicated     Urine Type NotGiven    EKG 12 Lead   Result Value Ref Range    Ventricular Rate 84 BPM    Atrial Rate 84 BPM    P-R Interval 192 ms    QRS Duration 104 ms    Q-T Interval 364 ms    QTc Calculation (Bazett) 430 ms    P Axis 22 degrees    R Axis 15 degrees    T Axis 20 degrees    Diagnosis       Normal sinus rhythmNormal ECGNo previous ECGs available       PROCEDURES      MEDICAL DECISION MAKING    Procedures/interventions/images ordered for this visit  Orders Placed This Encounter   Procedures    CT ABDOMEN PELVIS WO CONTRAST Additional Contrast? None    CBC auto in the patient's condition, which required my urgent intervention. This time does not include separately billable procedures. The note was completed using Dragon voice recognition transcription. Every effort was made to ensure accuracy; however, inadvertent transcription errors may be present despite my best efforts to edit errors.     Kwadwo Ivey MD  [address] [place] [address]        Kwadwo Ivey MD  07/07/20 [id number]

## 2020-07-08 NOTE — PROGRESS NOTES
Velasquez Morrison from [facility] called to report Pt's SPO2 level. Pt's SPO2 reading 87% on monitor. This RN went to assess Pt, Pt resting quietly. Pt states that he had \"dozed off and that he feels fine\". Pt assisted to elevate HOB, SPO2 reading while RN at bedside 93%.

## 2020-07-08 NOTE — PROGRESS NOTES
Handoff report and transfer of care given at bedside to [facility]. Patient in stable condition, denies needs/concerns at this time. Call light within reach.

## 2020-07-08 NOTE — CARE COORDINATION
Case Management Assessment  Initial Evaluation    Date/Time of Evaluation: 7/8/2020 3:19 PM  Assessment Completed by: Haja Russell    Patient Name: Kaylie Wu  YOB: 1950  Diagnosis: DORIS (acute kidney injury) [facility]) [N17.9]  Date / Time: 7/7/2020  5:58 PM  Admission status/Date: IP 07/07/2020  Chart Reviewed: Yes      Patient Franki Ang: Yes -via phone  Family Interviewed:  No      Hospitalization in the last 30 days:  No    Current PCP: [address] required for SNF : Notification      3 night stay required - NA    ADLS  Support Systems: Spouse/Significant Other  Transportation: self    Meal Preparation: self    Housing  Home Environment: single story house w/sp  Steps: 2  Plans to Return to Present Housing: Yes  Other Identified Issues: [address]  Currently active with [address] : No  Type of Home Care Services: [place]  Passport/Waiver : No  :                      Phone Number:    Passport/Waiver Services: NA          Durable Medical Equipment   DME Provider: NA  Equipment: NONE  Walker___Cane___RTS___ BSC___Shower Chair___  02__ at ____Liter(s)---Uses________HHN___ CPAP___ BiPap___ Hospital Bed___W/C____Other________      Has Home O2 in place on admit:  No  - 95% RA    Informed of need to bring portable home O2 tank on day of discharge for nursing to connect prior to leaving:   Not Indicated  Verbalized agreement/Understanding:   Not Indicated    Community Service Affiliation  Dialysis:  No    · Name:  · Location  · Dialysis Schedule:  · Phone:   · Fax: Outpatient PT/OT: No    Cancer Center: No     CHF Clinic: No     Pulmonary Rehab: No  Pain Clinic: No  Community Mental Health: No    Wound Clinic: No     COVID SCREENING: Yes - PENDING     The Plan for Transition of Care is related to the following treatment goals: Home. IPTA. No DCP needs identified. CM not following. Explained Case Management role/services.

## 2020-07-08 NOTE — PROGRESS NOTES
Bed side report given to PETER Flores. Pt awake, denies needs at this time. Batteries replaced in tele. New bag of IVF spiked.

## 2020-07-09 VITALS
HEART RATE: 63 BPM | DIASTOLIC BLOOD PRESSURE: 81 MMHG | OXYGEN SATURATION: 96 % | SYSTOLIC BLOOD PRESSURE: 137 MMHG | WEIGHT: 192.9 LBS | RESPIRATION RATE: 16 BRPM | TEMPERATURE: 98.3 F | HEIGHT: 71 IN | BODY MASS INDEX: 27.01 KG/M2

## 2020-07-09 LAB
ANION GAP SERPL CALCULATED.3IONS-SCNC: 9 MMOL/L (ref 3–16)
BUN BLDV-MCNC: 13 MG/DL (ref 7–20)
C DIFF TOXIN/ANTIGEN: NORMAL
CALCIUM SERPL-MCNC: 8.1 MG/DL (ref 8.3–10.6)
CHLORIDE BLD-SCNC: 107 MMOL/L (ref 99–110)
CO2: 24 MMOL/L (ref 21–32)
CREAT SERPL-MCNC: 0.9 MG/DL (ref 0.8–1.3)
GFR AFRICAN AMERICAN: >60
GFR NON-AFRICAN AMERICAN: >60
GLUCOSE BLD-MCNC: 107 MG/DL (ref 70–99)
GLUCOSE BLD-MCNC: 173 MG/DL (ref 70–99)
GLUCOSE BLD-MCNC: 93 MG/DL (ref 70–99)
PERFORMED ON: ABNORMAL
PERFORMED ON: NORMAL
POTASSIUM SERPL-SCNC: 4.6 MMOL/L (ref 3.5–5.1)
SARS-COV-2, PCR: NOT DETECTED
SODIUM BLD-SCNC: 140 MMOL/L (ref 136–145)

## 2020-07-09 PROCEDURE — 96361 HYDRATE IV INFUSION ADD-ON: CPT

## 2020-07-09 PROCEDURE — 6360000002 HC RX W HCPCS: Performed by: INTERNAL MEDICINE

## 2020-07-09 PROCEDURE — G0378 HOSPITAL OBSERVATION PER HR: HCPCS

## 2020-07-09 PROCEDURE — 96372 THER/PROPH/DIAG INJ SC/IM: CPT

## 2020-07-09 PROCEDURE — 99239 HOSP IP/OBS DSCHRG MGMT >30: CPT | Performed by: INTERNAL MEDICINE

## 2020-07-09 PROCEDURE — 2580000003 HC RX 258: Performed by: INTERNAL MEDICINE

## 2020-07-09 PROCEDURE — 87449 NOS EACH ORGANISM AG IA: CPT

## 2020-07-09 PROCEDURE — 6370000000 HC RX 637 (ALT 250 FOR IP): Performed by: INTERNAL MEDICINE

## 2020-07-09 PROCEDURE — 87324 CLOSTRIDIUM AG IA: CPT

## 2020-07-09 PROCEDURE — 87505 NFCT AGENT DETECTION GI: CPT

## 2020-07-09 PROCEDURE — 36415 COLL VENOUS BLD VENIPUNCTURE: CPT

## 2020-07-09 PROCEDURE — 80048 BASIC METABOLIC PNL TOTAL CA: CPT

## 2020-07-09 RX ORDER — AMLODIPINE BESYLATE 5 MG/1
5 TABLET ORAL DAILY
Status: DISCONTINUED | OUTPATIENT
Start: 2020-07-09 | End: 2020-07-09 | Stop reason: HOSPADM

## 2020-07-09 RX ADMIN — Medication 10 ML: at 10:28

## 2020-07-09 RX ADMIN — AMLODIPINE BESYLATE 5 MG: 5 TABLET ORAL at 10:27

## 2020-07-09 RX ADMIN — PANTOPRAZOLE SODIUM 40 MG: 40 TABLET, DELAYED RELEASE ORAL at 05:13

## 2020-07-09 RX ADMIN — ASPIRIN 81 MG 81 MG: 81 TABLET ORAL at 10:27

## 2020-07-09 RX ADMIN — INSULIN LISPRO 1 UNITS: 100 INJECTION, SOLUTION INTRAVENOUS; SUBCUTANEOUS at 12:08

## 2020-07-09 RX ADMIN — ENOXAPARIN SODIUM 30 MG: 30 INJECTION SUBCUTANEOUS at 10:28

## 2020-07-09 RX ADMIN — FINASTERIDE 5 MG: 5 TABLET, FILM COATED ORAL at 10:28

## 2020-07-09 RX ADMIN — PREGABALIN 150 MG: 100 CAPSULE ORAL at 10:27

## 2020-07-09 RX ADMIN — ATORVASTATIN CALCIUM 40 MG: 40 TABLET, FILM COATED ORAL at 10:28

## 2020-07-09 RX ADMIN — SODIUM CHLORIDE: 9 INJECTION, SOLUTION INTRAVENOUS at 05:13

## 2020-07-09 RX ADMIN — OXYBUTYNIN CHLORIDE 20 MG: 5 TABLET, EXTENDED RELEASE ORAL at 10:27

## 2020-07-09 ASSESSMENT — PAIN SCALES - GENERAL: PAINLEVEL_OUTOF10: 0

## 2020-07-09 NOTE — PLAN OF CARE
Problem: Falls - Risk of:  Goal: Will remain free from falls  Description: Will remain free from falls  7/9/2020 0059 by Sena Rosa RN  Outcome: Met This Shift  7/8/2020 1105 by John Givens RN  Outcome: Ongoing     Problem: SAFETY  Goal: Free from accidental physical injury  Outcome: Met This Shift     Problem: DAILY CARE  Goal: Daily care needs are met  7/9/2020 0059 by Sena Rosa RN  Outcome: Met This Shift  7/8/2020 1105 by John Givens RN  Outcome: Ongoing     Problem: PAIN  Goal: Patient's pain/discomfort is manageable  7/9/2020 0059 by Sena Rosa RN  Outcome: Met This Shift  7/8/2020 1105 by John Givens RN  Outcome: Ongoing     Problem: KNOWLEDGE DEFICIT  Goal: Patient/S.O. demonstrates understanding of disease process, treatment plan, medications, and discharge instructions.   Outcome: Met This Shift

## 2020-07-09 NOTE — PROGRESS NOTES
IV removed for d/c. D/c instructions reviewed and copy given to pt. Pt denies questions. Wife on way to pick patient up.

## 2020-07-09 NOTE — DISCHARGE INSTR - COC
I10    Hypercholesteremia E78.00    Sciatica of left side M54.32    Trigger ring finger of right hand M65.341    DORIS (acute kidney injury) ([facility] [address] N17.9    Febrile illness R50.9       Isolation/Infection:   Isolation          No Isolation        Patient Infection Status     Infection Onset Added Last Indicated Last Indicated By Review Planned Expiration Resolved Resolved By    None active    Resolved    COVID-19 Rule Out 07/08/20 07/08/20 07/08/20 COVID-19 (Ordered)   07/09/20 Rule-Out Test Resulted    C-diff Rule Out 07/08/20 07/08/20 07/09/20 Clostridium difficile toxin/antigen (Ordered)   07/09/20 Rule-Out Test Resulted          Nurse Assessment:  Last Vital Signs: /81   Pulse 63   Temp 98.3 °F (36.8 °C) (Oral)   Resp 16   Ht 5' 11\" (1.803 m)   Wt 192 lb 14.4 oz (87.5 kg)   SpO2 96%   BMI 26.90 kg/m²     Last documented pain score (0-10 scale): Pain Level: 0  Last Weight:   Wt Readings from Last 1 Encounters:   07/07/20 192 lb 14.4 oz (87.5 kg)     Mental Status:  {IP PT MENTAL STATUS:20030}    IV Access:  { YISEL IV ACCESS:209233008}    Nursing Mobility/ADLs:  Walking   {CHP DME ISFR:582572788}  Transfer  {P DME QSQR:654949028}  Bathing  {P DME TZVM:260673295}  Dressing  {P DME GFOI:523065892}  Toileting  {P DME UMTE:717916176}  Feeding  {P DME MEBV:015715112}  Med Admin  {P DME WJOC:452107734}  Med Delivery   { YISEL MED Delivery:394711845}    Wound Care Documentation and Therapy:  Incision 04/24/13 Shoulder Left (Active)   Number of days: 2633       Incision 07/08/16 Back Mid (Active)   Number of days: 1461       Incision 07/08/16 Back Lower;Right (Active)   Number of days: 1870        Elimination:  Continence:   · Bowel: {YES / EB:57959}  · Bladder: {YES / PT:40649}  Urinary Catheter: {Urinary Catheter:507695266}   Colostomy/Ileostomy/Ileal Conduit: {YES / RP:77667}       Date of Last BM: ***    Intake/Output Summary (Last 24 hours) at 7/9/2020 1221  Last data filed at 7/9/2020 1210  Gross per 24 hour   Intake 720 ml   Output 600 ml   Net 120 ml     I/O last 3 completed shifts:   In:  [P.O.:1060; I.V.:927]  Out: 600 [Urine:600]    Safety Concerns:     [address] Safety Concerns:832663413}    Impairments/Disabilities:      [address] Impairments/Disabilities:907104593}    Nutrition Therapy:  Current Nutrition Therapy:   [address] YISEL Diet List:596023118}    Routes of Feeding: {[facility] DME Other Feedings:057553866}  Liquids: {Slp liquid thickness:43220}  Daily Fluid Restriction: {CHP DME Yes amt example:258773689}  Last Modified Barium Swallow with Video (Video Swallowing Test): {Done Not Done PPDK:863097937}    Treatments at the Time of Hospital Discharge:   Respiratory Treatments: ***  Oxygen Therapy:  {Therapy; copd oxygen:82100}  Ventilator:    {MH CC Vent NDWQ:747018551}    Rehab Therapies: {THERAPEUTIC INTERVENTION:3909689374}  Weight Bearing Status/Restrictions: { CC Weight Bearin}  Other Medical Equipment (for information only, NOT a DME order):  {EQUIPMENT:932457534}  Other Treatments: ***    Patient's personal belongings (please select all that are sent with patient):  {[facility] DME Belongings:776788487}    RN SIGNATURE:  {Esignature:245752972}    CASE MANAGEMENT/SOCIAL WORK SECTION    Inpatient Status Date: ***    Readmission Risk Assessment Score:  Readmission Risk              Risk of Unplanned Readmission:        17           Discharging to Facility/ Agency   · Name:   · Address:  · Phone:  · Fax:    Dialysis Facility (if applicable)   · Name:  · Address:  · Dialysis Schedule:  · Phone:  · Fax:    / signature: {Esignature:056954451}    PHYSICIAN SECTION    Prognosis: {Prognosis:2019477260}    Condition at Discharge: [address] Patient Condition:611140025}    Rehab Potential (if transferring to Rehab): {Prognosis:9897814393}    Recommended Labs or Other Treatments After Discharge: ***    Physician Certification: I certify the above information and transfer of Jessi Pain  is necessary for the continuing treatment of the diagnosis listed and that he requires {Admit to Appropriate Level of Care:28815} for {GREATER/LESS:936418497} 30 days.      Update Admission H&P: {CHP DME Changes in PWPLX:411758823}    PHYSICIAN SIGNATURE:  {Esignature:320797069}

## 2020-07-09 NOTE — PROGRESS NOTES
Pt resting with eyes closed, respirs witnessed as e/e, no signs of distress. SR up x2, bed in lowest  position, wheels locked. Call light and bedside table in easy reach.    Aroldo John RN

## 2020-07-09 NOTE — PROGRESS NOTES
Pt resting with eyes closed, respirs witnessed as e/e, no signs of distress. SR up x2, bed in lowest  position, wheels locked. Call light and bedside table in easy reach.    Chaitanya Santos, RN

## 2020-07-09 NOTE — PLAN OF CARE
Problem: SAFETY  Goal: Free from accidental physical injury  7/9/2020 0059 by Rachel Poole RN  Outcome: Met This Shift     Problem: KNOWLEDGE DEFICIT  Goal: Patient/S.O. demonstrates understanding of disease process, treatment plan, medications, and discharge instructions.   7/9/2020 0059 by Rachel Poole RN  Outcome: Met This Shift     Problem: Falls - Risk of:  Goal: Will remain free from falls  Description: Will remain free from falls  7/9/2020 0829 by Katja Llamas RN  Outcome: Ongoing  7/9/2020 0059 by Rachel Poole RN  Outcome: Met This Shift     Problem: DAILY CARE  Goal: Daily care needs are met  7/9/2020 0829 by Katja Llamas RN  Outcome: Ongoing  7/9/2020 0059 by Rachel Poole RN  Outcome: Met This Shift     Problem: PAIN  Goal: Patient's pain/discomfort is manageable  7/9/2020 0829 by Katja Llamas RN  Outcome: Ongoing  7/9/2020 0059 by Rachel Poole RN  Outcome: Met This Shift     Problem: DISCHARGE BARRIERS  Goal: Patient's continuum of care needs are met  Outcome: Ongoing

## 2020-07-10 ENCOUNTER — CARE COORDINATION (OUTPATIENT)
Dept: CASE MANAGEMENT | Age: 70
End: 2020-07-10

## 2020-07-10 LAB — GI BACTERIAL PATHOGENS BY PCR: NORMAL

## 2020-07-10 NOTE — CARE COORDINATION
Patient contacted regarding Marek Nguyen. COVID-19 related testing which was available at this time. Test results were negative. Patient informed of results, if available? Yes    Care Transition Nurse contacted the family by telephone to perform post discharge assessment. Verified name and  with family as identifiers. Provided introduction to self, and explanation of the CTN role, and reason for call due to risk factors for infection and/or exposure to COVID-19. Symptoms reviewed with family who verbalized the following symptoms: no new symptoms and no worsening symptoms. Due to no new or worsening symptoms encounter was not routed to provider for escalation. Discussed follow-up appointments. If no appointment was previously scheduled, appointment scheduling offered: Yes  [facility] follow up appointment(s): No future appointments. Non-[facility] follow up appointment(s): will schedule follow up at [facility]     Patient has following risk factors of: diabetes. CTN reviewed discharge instructions, medical action plan and red flags such as increased shortness of breath, increasing fever and signs of decompensation with family who verbalized understanding. Discussed exposure protocols and quarantine with CDC Guidelines What to do if you are sick with coronavirus disease .  Family was given an opportunity for questions and concerns. The family agrees to contact the [facility] exposure line [phone], [facility] department [address]: ([phone]) and PCP office for questions related to their healthcare. CTN/ACM provided contact information for future needs. Reviewed and educated family on any new and changed medications related to discharge diagnosis     Reached Gaurav Menjivar, spouse (okay per HIPAA). She reports that Kishore Parker is well and is outside at this time. Denies any needs/concerns. No changes to medications were made. Plans follow up at [facility].  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 Episode resolved at this time.      Sharron Moreno, RN  Care Transition Nurse  [phone] mobile

## 2020-07-12 LAB
BLOOD CULTURE, ROUTINE: NORMAL
CULTURE, BLOOD 2: NORMAL

## 2020-07-14 NOTE — DISCHARGE SUMMARY
Name:  Idalia May  Room:  0213/0213-02  MRN:    [id number]    Discharge Summary      This discharge summary is in conjunction with a complete physical exam done on the day of discharge. Attending Physician: Dr. Zina Jean Baptiste  Discharging Physician: Dr. Munir Diamond2020  Discharge:  2020    HPI:  The patient is a 79 y.o. male with anxiety, arthritis, chronic pain, diabetes, hyperlipidemia, hypertension who presented to [facility] ED with complaint of hypotension, nausea vomiting and diarrhea. His oral intake has been poor. Denies having any fever or chills. Denies any chest pain, shortness of breath or cough.     Admitted for acute kidney injury. He developed fevers overnight. Denies cough, shortness of breath or myalgias. Diagnoses this Admission and Hospital Course   Febrile Illness   - Was afebrile on arrival but developed fevers overnight with Tmax 101.2  - No acute source at this time  - Check stool studies, Lactic acid, PCT, and Blood Cx. All negative. UA is negative. Obtain chest x-ray. This was negative  COVID negative.      DORIS   Secondary to nausea vomiting diarrhea and volume depletion in the setting of being on hydrochlorothiazide and ACE inhibitor.  - Baseline creatinine ~0.8  - Creatinine on admission 2.4-->0.9  - likely 2/2 GI losses and HCTZ/ACE home medications-->held   - Continued IVF and monitored  - Nephrology consulted      Abdominal Pain   N/V/D  - CT WO contrast shows no acute abnormalities   - Patient does have a hx of c. Diff, C. Diff precautions  - No leukocytosis on admission   - Added on stool studies. These were negative. - Continued PPI   - symptoms resolved.  Tolerating diet.      DM2  - BG elevated on arrival to ED   - Continue SSI  - held home oral Rx   - Hgb A1c: 6.8  - BG improved     HLD  - Continued statin      Chronic Back Pain   Scoliosis  - s/p spinal cord stimulator 16-->had failure of stimulator and removal on 10/28/19  - Continued home Norco and Lyrica, OARRS confirmed      HTN  - BP is controlled   - Held home HCTZ and ACE 2/2 DORIS   - Monitored  - resumed at d/c.       DVT Prophylaxis: Lovenox    Procedures (Please Review Full Report for Details)  N/A    Consults    N/A      Physical Exam at Discharge:    /81   Pulse 63   Temp 98.3 °F (36.8 °C) (Oral)   Resp 16   Ht 5' 11\" (1.803 m)   Wt 192 lb 14.4 oz (87.5 kg)   SpO2 96%   BMI 26.90 kg/m²     Gen: No distress. Alert. Eyes: PERRL. No sclera icterus. No conjunctival injection. ENT: No discharge. Pharynx clear. Neck: No JVD. No Carotid Bruit. Trachea midline. Resp: No accessory muscle use. No crackles. No wheezes. No rhonchi. CV: Regular rate. Regular rhythm. No murmur. No rub. No edema. Capillary Refill: Brisk,< 3 seconds   Peripheral Pulses: +2 palpable, equal bilaterally   GI: Non-tender. Non-distended. No masses. No organomegaly. Normal bowel sounds. No hernia. Skin: Warm and dry. No nodule on exposed extremities. No rash on exposed extremities. M/S: No cyanosis. No joint deformity. No clubbing. Neuro: Awake. Grossly nonfocal    Psych: Oriented x 3. No anxiety or agitation. U/A:    Lab Results   Component Value Date    COLORU Yellow 07/07/2020    WBCUA None seen 08/27/2014    RBCUA 0-2 08/27/2014    MUCUS Trace 08/16/2010    BACTERIA Rare 08/16/2010    CLARITYU Clear 07/07/2020    SPECGRAV 1.015 07/07/2020    LEUKOCYTESUR Negative 07/07/2020    BLOODU Negative 07/07/2020    GLUCOSEU Negative 07/07/2020    GLUCOSEU Negative 08/16/2010         CULTURES  Blood Cx: NGTD  C. Diff: negative  GI panel: negative  COVID: negative    RADIOLOGY  XR CHEST PORTABLE   Final Result   Negative portable study. CT ABDOMEN PELVIS WO CONTRAST Additional Contrast? None   Final Result   No significant findings in the abdomen or pelvis to correlate to the   patient's clinical symptoms.                Discharge Medications     Medication List      CONTINUE taking these medications    alprostadil 1000 MCG pellet  Commonly known as:  MUSE     aspirin 81 MG tablet     atorvastatin 40 MG tablet  Commonly known as:  LIPITOR     BUPROPION HCL PO     ferrous sulfate 325 (65 Fe) MG tablet  Commonly known as:  IRON 325     finasteride 5 MG tablet  Commonly known as:  PROSCAR     gabapentin 300 MG capsule  Commonly known as:  NEURONTIN     hydroCHLOROthiazide 25 MG tablet  Commonly known as:  HYDRODIURIL     hydrOXYzine 25 MG capsule  Commonly known as:  VISTARIL     latanoprost 0.005 % ophthalmic solution  Commonly known as:  XALATAN     lidocaine 5 % ointment  Commonly known as:  XYLOCAINE     lisinopril 40 MG tablet  Commonly known as:  PRINIVIL;ZESTRIL     lubiprostone 24 MCG capsule  Commonly known as:  Amitiza  Take 1 capsule by mouth 2 times daily (with meals). metFORMIN 500 MG tablet  Commonly known as:  GLUCOPHAGE     mirtazapine 15 MG tablet  Commonly known as:  REMERON     nystatin 639325 UNIT/GM cream  Commonly known as:  MYCOSTATIN     omeprazole 20 MG delayed release capsule  Commonly known as:  PRILOSEC     oxybutynin 10 MG extended release tablet  Commonly known as:  DITROPAN-XL     prazosin 2 MG capsule  Commonly known as:  MINIPRESS     pregabalin 150 MG capsule  Commonly known as:  LYRICA     therapeutic multivitamin-minerals tablet              Discharged in stable condition to home. Follow Up: Follow up with PCP. Total time spent on discharge is 35 minutes    YULIA Julien.

## 2020-11-20 ENCOUNTER — HOSPITAL ENCOUNTER (OUTPATIENT)
Age: 70
Discharge: HOME | End: 2020-11-20
Payer: COMMERCIAL

## 2020-11-20 ENCOUNTER — HOSPITAL ENCOUNTER (OUTPATIENT)
Dept: GENERAL RADIOLOGY | Age: 70
Discharge: HOME | End: 2020-11-20
Payer: COMMERCIAL

## 2020-11-20 PROCEDURE — 72070 X-RAY EXAM THORAC SPINE 2VWS: CPT

## 2021-02-26 ENCOUNTER — IMMUNIZATION (OUTPATIENT)
Dept: PRIMARY CARE CLINIC | Age: 71
End: 2021-02-26
Payer: COMMERCIAL

## 2021-02-26 PROCEDURE — 0001A COVID-19, PFIZER VACCINE 30MCG/0.3ML DOSE: CPT | Performed by: FAMILY MEDICINE

## 2021-02-26 PROCEDURE — 91300 COVID-19, PFIZER VACCINE 30MCG/0.3ML DOSE: CPT | Performed by: FAMILY MEDICINE

## 2021-03-19 ENCOUNTER — IMMUNIZATION (OUTPATIENT)
Dept: PRIMARY CARE CLINIC | Age: 71
End: 2021-03-19
Payer: COMMERCIAL

## 2021-03-19 PROCEDURE — 0002A COVID-19, PFIZER VACCINE 30MCG/0.3ML DOSE: CPT | Performed by: FAMILY MEDICINE

## 2021-03-19 PROCEDURE — 91300 COVID-19, PFIZER VACCINE 30MCG/0.3ML DOSE: CPT | Performed by: FAMILY MEDICINE

## 2022-09-27 ENCOUNTER — HOSPITAL ENCOUNTER (EMERGENCY)
Age: 72
Discharge: HOME | End: 2022-09-27
Payer: COMMERCIAL

## 2022-09-27 ENCOUNTER — APPOINTMENT (OUTPATIENT)
Dept: GENERAL RADIOLOGY | Age: 72
End: 2022-09-27
Payer: COMMERCIAL

## 2022-09-27 ENCOUNTER — APPOINTMENT (OUTPATIENT)
Dept: CT IMAGING | Age: 72
End: 2022-09-27
Payer: COMMERCIAL

## 2022-09-27 VITALS
DIASTOLIC BLOOD PRESSURE: 99 MMHG | SYSTOLIC BLOOD PRESSURE: 180 MMHG | RESPIRATION RATE: 16 BRPM | HEIGHT: 71 IN | BODY MASS INDEX: 28.7 KG/M2 | TEMPERATURE: 98.3 F | HEART RATE: 60 BPM | OXYGEN SATURATION: 96 % | WEIGHT: 205 LBS

## 2022-09-27 DIAGNOSIS — S46.912A STRAIN OF LEFT SHOULDER, INITIAL ENCOUNTER: ICD-10-CM
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
## 2022-09-27 DIAGNOSIS — S62.112A CLOSED DISPLACED FRACTURE OF TRIQUETRUM OF LEFT WRIST, INITIAL ENCOUNTER: ICD-10-CM

## 2022-09-27 DIAGNOSIS — T07.XXXA MULTIPLE CONTUSIONS: ICD-10-CM

## 2022-09-27 PROCEDURE — 73110 X-RAY EXAM OF WRIST: CPT

## 2022-09-27 PROCEDURE — 73090 X-RAY EXAM OF FOREARM: CPT

## 2022-09-27 PROCEDURE — 71046 X-RAY EXAM CHEST 2 VIEWS: CPT

## 2022-09-27 PROCEDURE — 99284 EMERGENCY DEPT VISIT MOD MDM: CPT

## 2022-09-27 PROCEDURE — 72125 CT NECK SPINE W/O DYE: CPT

## 2022-09-27 PROCEDURE — 6370000000 HC RX 637 (ALT 250 FOR IP): Performed by: PHYSICIAN ASSISTANT

## 2022-09-27 PROCEDURE — 73552 X-RAY EXAM OF FEMUR 2/>: CPT

## 2022-09-27 PROCEDURE — 29125 APPL SHORT ARM SPLINT STATIC: CPT

## 2022-09-27 PROCEDURE — 73030 X-RAY EXAM OF SHOULDER: CPT

## 2022-09-27 PROCEDURE — 73521 X-RAY EXAM HIPS BI 2 VIEWS: CPT

## 2022-09-27 PROCEDURE — 73080 X-RAY EXAM OF ELBOW: CPT

## 2022-09-27 PROCEDURE — 70450 CT HEAD/BRAIN W/O DYE: CPT

## 2022-09-27 RX ORDER — BUPROPION HYDROCHLORIDE 300 MG/1
TABLET ORAL
COMMUNITY
Start: 2020-07-14

## 2022-09-27 RX ORDER — HYDROCODONE BITARTRATE AND ACETAMINOPHEN 10; 325 MG/1; MG/1
TABLET ORAL
COMMUNITY
Start: 2021-02-05

## 2022-09-27 RX ORDER — ACETAMINOPHEN 500 MG
1000 TABLET ORAL ONCE
Status: COMPLETED | OUTPATIENT
Start: 2022-09-27 | End: 2022-09-27

## 2022-09-27 RX ORDER — ATORVASTATIN CALCIUM 40 MG/1
TABLET, FILM COATED ORAL
COMMUNITY
Start: 2020-04-14

## 2022-09-27 RX ORDER — FINASTERIDE 5 MG/1
TABLET, FILM COATED ORAL
COMMUNITY
Start: 2020-08-13

## 2022-09-27 RX ADMIN — ACETAMINOPHEN 1000 MG: 500 TABLET ORAL at 14:47

## 2022-09-27 ASSESSMENT — PAIN - FUNCTIONAL ASSESSMENT: PAIN_FUNCTIONAL_ASSESSMENT: 0-10

## 2022-09-27 ASSESSMENT — PAIN SCALES - GENERAL
PAINLEVEL_OUTOF10: 8
PAINLEVEL_OUTOF10: 6
PAINLEVEL_OUTOF10: 6

## 2022-09-27 NOTE — ED PROVIDER NOTES
bed capacity and other potential inpatient and outpatient resources were constrained due to the viral pandemic. REVIEW OF SYSTEMS    (2-9 systems for level 4, 10 or more for level 5)     Review of Systems    Positives and Pertinent negatives as per HPI. Except as noted abovein the ROS, all other systems were reviewed and negative.        PAST MEDICAL HISTORY     Past Medical History:   Diagnosis Date    Anxiety     Arthritis     Back pain, chronic     Chronic neck pain     Diabetes mellitus ([address]     Glaucoma     Hypercholesteremia     Hypertension     Stool culture positive for Clostridium difficile 01/10/2014         SURGICAL HISTORY     Past Surgical History:   Procedure Laterality Date    CARPAL TUNNEL RELEASE Right     CERVICAL FUSION      CHOLECYSTECTOMY      COLONOSCOPY      ENDOSCOPY, COLON, DIAGNOSTIC      HEMORRHOID SURGERY      ROTATOR CUFF REPAIR      SHOULDER ARTHROSCOPY Left 4/24/13    subacromial decompression, rotator cuff repair    THORACIC LAMINECTOMY  07/08/2016    T10-11 laminectomy and placement of spinal stimulator and battery    WISDOM TOOTH EXTRACTION           CURRENTMEDICATIONS       Previous Medications    ALPROSTADIL (MUSE) 1000 MCG PELLET    1,000 mcg by Transurethral route as needed for Erectile Dysfunction use no more than 3 times per week    ASPIRIN 81 MG TABLET    Take 81 mg by mouth daily    ATORVASTATIN (LIPITOR) 40 MG TABLET    Take 40 mg by mouth daily    ATORVASTATIN (LIPITOR) 40 MG TABLET    TAKE ONE-HALF TABLET BY MOUTH AT BEDTIME FOR CHOLESTEROL    BUPROPION (WELLBUTRIN XL) 300 MG EXTENDED RELEASE TABLET    TAKE ONE TABLET BY MOUTH EVERY MORNING    BUPROPION HCL PO    Take 300 mg by mouth daily     FERROUS SULFATE 325 (65 FE) MG TABLET    Take 325 mg by mouth daily (with breakfast)    FINASTERIDE (PROSCAR) 5 MG TABLET    Take 5 mg by mouth daily    FINASTERIDE (PROSCAR) 5 MG TABLET    TAKE ONE TABLET BY MOUTH ONCE DAILY FOR PROSTATE (IF THIS MEDICATION IS CRUSHED PRIOR TO ADMINISTRATION, IT MUST NOT BE HANDLED BY WOMEN WHO ARE PREGNANT OR WHO MAY BECOME PREGNANT)    GABAPENTIN (NEURONTIN) 300 MG CAPSULE    Take 600 mg by mouth 3 times daily. HYDROCHLOROTHIAZIDE (HYDRODIURIL) 25 MG TABLET    Take 25 mg by mouth daily Only takes 1/2 pill daily    HYDROCODONE-ACETAMINOPHEN (NORCO)  MG PER TABLET    TAKE 1 TABLET BY MOUTH AS DIRECTED ONCE OR TWICE A DAY IF NEEDED FOR SEVERE PAIN (MAX 3000MG/DAY ACETAMINOPHEN FROM ALL SOURCES)    HYDROXYZINE (VISTARIL) 25 MG CAPSULE    Take 25 mg by mouth 3 times daily as needed for Itching    LATANOPROST (XALATAN) 0.005 % OPHTHALMIC SOLUTION    Place 1 drop into both eyes nightly. LIDOCAINE (XYLOCAINE) 5 % OINTMENT    Apply topically as needed for Pain Apply topically as needed. LISINOPRIL (PRINIVIL;ZESTRIL) 40 MG TABLET    Take 40 mg by mouth daily    LUBIPROSTONE (AMITIZA) 24 MCG CAPSULE    Take 1 capsule by mouth 2 times daily (with meals). METFORMIN (GLUCOPHAGE) 500 MG TABLET    Take 1,000 mg by mouth 2 times daily (with meals)     MIRTAZAPINE (REMERON) 15 MG TABLET    Take 15 mg by mouth nightly    MULTIPLE VITAMINS-MINERALS (THERAPEUTIC MULTIVITAMIN-MINERALS) TABLET    Take 1 tablet by mouth daily    NYSTATIN (MYCOSTATIN) 791777 UNIT/GM CREAM    Apply topically 2 times daily Apply topically 2 times daily. OMEPRAZOLE (PRILOSEC) 20 MG CAPSULE    Take 40 mg by mouth 2 times daily. OXYBUTYNIN (DITROPAN-XL) 10 MG CR TABLET    Take 20 mg by mouth daily     PRAZOSIN (MINIPRESS) 2 MG CAPSULE    Take 8 mg by mouth nightly     PREGABALIN (LYRICA) 150 MG CAPSULE    Take 150 mg by mouth 2 times daily. 300 mg at         ALLERGIES     Patient has no known allergies.     FAMILYHISTORY       Family History   Problem Relation Age of Onset    Cancer Mother     Heart Disease Father     Cancer Sister         breast    Cancer Sister           SOCIAL HISTORY       Social History     Socioeconomic History    Marital status:      Spouse name: None    Number of children: None    Years of education: None    Highest education level: None   Tobacco Use    Smoking status: Former     Packs/day: 2.00     Years: 45.00     Pack years: 90.00     Types: Cigarettes     Quit date: 2010     Years since quittin.0    Smokeless tobacco: Never   Substance and Sexual Activity    Alcohol use: No     Comment: rarely    Drug use: No    Sexual activity: Not Currently       SCREENINGS    [place] Coma Scale  Eye Opening: Spontaneous  Best Verbal Response: Oriented  Best Motor Response: Obeys commands  [place] Coma Scale Score: 15        PHYSICAL EXAM    (up to 7 for level 4, 8 or more for level 5)     ED Triage Vitals [22 1341]   BP Temp Temp Source Heart Rate Resp SpO2 Height Weight   (!) 190/95 98.3 °F (36.8 °C) Oral 55 20 94 % 5' 11\" (1.803 m) 205 lb (93 kg)       Physical Exam  Vitals and nursing note reviewed. Constitutional:       General: He is not in acute distress. Appearance: He is well-developed. He is not ill-appearing, toxic-appearing or diaphoretic. HENT:      Head: Normocephalic and atraumatic. No raccoon eyes or Desai's sign. Jaw: There is normal jaw occlusion. Right Ear: Hearing, tympanic membrane, ear canal and external ear normal.      Left Ear: Hearing, tympanic membrane, ear canal and external ear normal.      Nose: Nose normal.   Eyes:      General:         Right eye: No discharge. Left eye: No discharge. Cardiovascular:      Rate and Rhythm: Normal rate and regular rhythm. Pulses: Normal pulses. Heart sounds: Normal heart sounds. No murmur heard. No friction rub. No gallop. Pulmonary:      Effort: Pulmonary effort is normal. No respiratory distress. Breath sounds: Normal breath sounds. No wheezing or rales. Chest:      Chest wall: No mass, deformity or tenderness. Abdominal:      General: Abdomen is flat. There is no distension. Palpations: Abdomen is soft. Tenderness:  There is no abdominal tenderness. Musculoskeletal:      Left shoulder: Tenderness present. No swelling, deformity, effusion, laceration, bony tenderness or crepitus. Decreased range of motion. Left upper arm: Tenderness present. No swelling, edema, deformity, lacerations or bony tenderness. Left elbow: No swelling, deformity, effusion or lacerations. Normal range of motion. Tenderness present in radial head, medial epicondyle, lateral epicondyle and olecranon process. Left forearm: Tenderness present. No swelling, edema, deformity, lacerations or bony tenderness. Left wrist: Swelling, tenderness and bony tenderness present. No deformity, effusion, lacerations, snuff box tenderness or crepitus. Decreased range of motion. Left hand: Normal.      Cervical back: Normal, normal range of motion and neck supple. Thoracic back: Normal.      Lumbar back: Normal.      Left hip: Tenderness present. No deformity, lacerations, bony tenderness or crepitus. Normal range of motion. Left upper leg: Tenderness present. No swelling, edema, deformity, lacerations or bony tenderness. Right lower leg: No edema. Left lower leg: No edema. Skin:     General: Skin is warm and dry. Capillary Refill: Capillary refill takes less than 2 seconds. Neurological:      General: No focal deficit present. Mental Status: He is alert and oriented to person, place, and time. Cranial Nerves: No cranial nerve deficit. Motor: No weakness. Gait: Gait normal.   Psychiatric:         Behavior: Behavior normal.         DIAGNOSTIC RESULTS   LABS:    Labs Reviewed - No data to display    All other labs were within normal range or not returned as of this dictation. EKG: All EKG's are interpreted by the Emergency Department Physician who either signs orCo-signs this chart in the absence of a cardiologist.  Please see their note for interpretation of EKG.       RADIOLOGY:   Non-plain film images such as CT, Ultrasound and MRI are read by the radiologist. Plain radiographic images are visualized andpreliminarily interpreted by the  ED Provider with the below findings:        Interpretation perthe Radiologist below, if available at the time of this note:    XR FEMUR LEFT (MIN 2 VIEWS)   Final Result   No acute fracture or dislocation         XR RADIUS ULNA LEFT (2 VIEWS)   Final Result   Left elbow: No acute abnormality identified. Left forearm: No acute abnormality identified. Left wrist: Triquetral avulsion fracture found dorsally. XR HIP BILATERAL W AP PELVIS (2 VIEWS)   Final Result   No acute fracture or dislocation. XR WRIST LEFT (MIN 3 VIEWS)   Final Result   Left elbow: No acute abnormality identified. Left forearm: No acute abnormality identified. Left wrist: Triquetral avulsion fracture found dorsally. XR ELBOW LEFT (MIN 3 VIEWS)   Final Result   Left elbow: No acute abnormality identified. Left forearm: No acute abnormality identified. Left wrist: Triquetral avulsion fracture found dorsally. XR SHOULDER LEFT (MIN 2 VIEWS)   Final Result   No acute fracture or dislocation. XR CHEST (2 VW)   Final Result   No radiographic evidence of acute pulmonary disease. CT CERVICAL SPINE WO CONTRAST   Final Result   No acute abnormality of the cervical spine. Degenerative, osteopenic and surgical changes cervical spine. CT HEAD WO CONTRAST   Final Result   No acute intracranial abnormality. Moderate senescent changes with parenchymal volume loss and chronic small   vessel ischemic changes. CT CERVICAL SPINE WO CONTRAST    Result Date: 9/27/2022  EXAMINATION: CT OF THE CERVICAL SPINE WITHOUT CONTRAST 9/27/2022 1:55 pm TECHNIQUE: CT of the cervical spine was performed without the administration of intravenous contrast. Multiplanar reformatted images are provided for review.  Automated exposure control, iterative reconstruction, and/or weight based adjustment of the mA/kV was utilized to reduce the radiation dose to as low as reasonably achievable. COMPARISON: None. HISTORY: ORDERING SYSTEM PROVIDED HISTORY: fall TECHNOLOGIST PROVIDED HISTORY: Reason for exam:->fall Release to patient - Note: Delayed release will only apply to this order. Orders that are changed or resulted outside of the EHR will not respect a [facility]. ->Delay Immediate Release by 3 Days Reason for preventing immediate release->Likely risk of substantial harm Decision Support Exception - unselect if not a suspected or confirmed emergency medical condition->Emergency Medical Condition (MA) Reason for Exam: Fall FINDINGS: BONES/ALIGNMENT: There is no acute fracture or traumatic malalignment. Anterior fusion C4-C5-C6. Laminectomy changes noted at C4 and C5. . DEGENERATIVE CHANGES: Moderate to severe cervical degenerative changes identified. SOFT TISSUES: There is no prevertebral soft tissue swelling. No acute abnormality of the cervical spine. Degenerative, osteopenic and surgical changes cervical spine.           PROCEDURES   Unless otherwise noted below, none     Procedures    CRITICAL CARE TIME   N/A    CONSULTS:  None      EMERGENCY DEPARTMENT COURSE and DIFFERENTIALDIAGNOSIS/MDM:   Vitals:    Vitals:    09/27/22 1341 09/27/22 1446 09/27/22 1535   BP: (!) 190/95 (!) 206/110 (!) 186/94   Pulse: 55 63 57   Resp: 20 18 18   Temp: 98.3 °F (36.8 °C)     TempSrc: Oral     SpO2: 94% 97% 97%   Weight: 205 lb (93 kg)     Height: 5' 11\" (1.803 m)         Patient was given thefollowing medications:  Medications   acetaminophen (TYLENOL) tablet 1,000 mg (1,000 mg Oral Given 9/27/22 1447)       PDMP Monitoring:    Last PDMP Carlos as Reviewed [facility]):  Review User Review Instant Review Result            Urine Drug Screenings (1 yr)       Urine Drug Screen  Collected: 8/27/2014 10:10 AM (Final result)              Drug screen multi urine  Collected: dislocation. We have discussed the symptoms which are most concerning (e.g., bloody stool, fever, changing or worsening pain, vomiting) that necessitate immediate return. Pt is in agreement with the current plan and all questions were addressed. Is this patient to be included in the SEP-1 Core Measure due to severe sepsis or septic shock? No   Exclusion criteria - the patient is NOT to be included for SEP-1 Core Measure due to: Infection is not suspected    Discharge Time out:  CC Reviewed Yes   Test Results Yes       Vitals:    09/27/22 1535   BP: (!) 186/94   Pulse: 57   Resp: 18   Temp:    SpO2: 97%              FINAL IMPRESSION      1. Fall, initial encounter    2. Closed displaced fracture of triquetrum of left wrist, initial encounter    3. Multiple contusions    4. Strain of left shoulder, initial encounter          DISPOSITION/PLAN   DISPOSITION Decision To Discharge 09/27/2022 05:25:37 PM      PATIENT REFERREDTO:  Ora Dudley MD  [address] [address]    Schedule an appointment as soon as possible for a visit       Zelalem Gonzalez MD  [address] [place] [address]  [phone]    Schedule an appointment as soon as possible for a visit   For a recheck within the next week    [facility] Emergency Department  [address] [address]  [phone]  Go to   If symptoms worsen    DISCHARGE MEDICATIONS:  New Prescriptions    No medications on file       DISCONTINUED MEDICATIONS:  Discontinued Medications    No medications on file              (Please note that portions ofthis note were completed with a voice recognition program.  Efforts were made to edit the dictations but occasionally words are mis-transcribed.)    Tyler Young (electronically signed)       Tyler Young  09/27/22 2270

## 2022-09-27 NOTE — ED NOTES
Pt AVS and follow up reviewed. Pt expressed understanding and d/c at this time.       Laura Gonzales RN  09/27/22 0677

## 2022-09-28 ENCOUNTER — TELEPHONE (OUTPATIENT)
Dept: ORTHOPEDIC SURGERY | Age: 72
End: 2022-09-28

## 2022-09-28 ENCOUNTER — OFFICE VISIT (OUTPATIENT)
Dept: ORTHOPEDIC SURGERY | Age: 72
End: 2022-09-28
Payer: COMMERCIAL

## 2022-09-28 VITALS — BODY MASS INDEX: 28.7 KG/M2 | WEIGHT: 205 LBS | RESPIRATION RATE: 15 BRPM | HEIGHT: 71 IN

## 2022-09-28 DIAGNOSIS — S62.115A CLOSED NONDISPLACED FRACTURE OF TRIQUETRUM OF LEFT WRIST, INITIAL ENCOUNTER: Primary | ICD-10-CM

## 2022-09-28 PROCEDURE — L3908 WHO COCK-UP NONMOLDE PRE OTS: HCPCS | Performed by: ORTHOPAEDIC SURGERY

## 2022-09-28 PROCEDURE — 1123F ACP DISCUSS/DSCN MKR DOCD: CPT | Performed by: ORTHOPAEDIC SURGERY

## 2022-09-28 PROCEDURE — 99213 OFFICE O/P EST LOW 20 MIN: CPT | Performed by: ORTHOPAEDIC SURGERY

## 2022-09-28 PROCEDURE — 25630 CLTX CARPL FX W/O MNPJ EA B1: CPT | Performed by: ORTHOPAEDIC SURGERY

## 2022-09-28 NOTE — TELEPHONE ENCOUNTER
Appointment Request     Patient requesting earlier appointment: Yes  Appointment offered to patient: [address]  Patient Contact Number:  [phone]    PT HAS FX IN WRIST.  ONLY [facility]

## 2022-09-28 NOTE — TELEPHONE ENCOUNTER
Left a vm for the patient letting him know that Dr. Inder Raymond will see him today at the [facility] office. Patient needs to be at the office at [phone] for a 1 appointment.

## 2022-09-28 NOTE — PROGRESS NOTES
This 67 y.o.  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 [place]   with a chief complaint of injury to their left wrist, which was injured 1 day ago when he fell. He noticed pain, swelling, and bruising. He was evaluated at the [facility] were obtained and the patient was splinted and  referred for hand/upper extremity evaluation and treatment. There is no history of additional significant injury. Symptoms have not changed since the date of injury. The pain assessment has been reviewed and is correct. The patient's social history, past medical history, family history, medications, allergies and review of systems, entered 9/28/22,  have been reviewed, and dated and are recorded in the chart. On physical examination the patient is Height: 5' 11\" (180.3 cm) tall and weighs Weight: 205 lb (93 kg). Respirations are 18 per minute. The patient is well nourished, is oriented to time and place, demonstrates appropriate mood and affect as well as normal gait and station. There is mild soft tissue swelling present about the left wrist, dorsal.  There is mild discoloration. There is no deformity. Tenderness is present on palpation in the area of the left wrist, dorsal  Range of motion of the wrist is limited only on the left and is accompanied by pain. Skin is intact, as is distal circulation and sensation. Gross muscle strength is limited only on the left   Hand and wrist joints are stable. There are no subcutaneous nodules or enlarged epitrochlear lymph nodes. I have personally reviewed and interpreted all previous external imaging studies(Xrays), laboratory tests, diagnostic proceedures and medical encounters pertinent to this patient's visit today. Xrays: Review and independent interpretation of the recent external Xrays of the left hand, wrist, and elbow demonstrate an avulsion fracture of the left triquetrum.      Impression: Fracture triquetrum left wrist.    The Xrays are shown to the patient and his wife. The nature of this medical problem is fully discussed with the patient, including all treatment options. All questions are answered. He is fitted with a wrist immobilizer brace and is instructed regarding it's care and use. The patient is carefully instructed regarding activity restrictions/precautions and pain control. All questions are answered. A return appoinment is made for 2 weeks for Xrays . The patient is asked  to call me sooner if there are any questions or if severe pain or swelling occurs. Procedures    NE CLOSED RX CARPAL FX    Verenice Sherman Titan Wrist Long Forearm Brace     Patient was prescribed a Verenice Sherman Titan Wrist and Forearm Brace. The left wrist will require stabilization / immobilization from this semi-rigid / rigid orthosis to improve their function. The orthosis will assist in protecting the affected area, provide functional support and facilitate healing. The patient was educated and fit by a healthcare professional with expert knowledge and specialization in brace application while under the direct supervision of the treating physician. Verbal and written instructions for the use of and application of this item were provided. They were instructed to contact the office immediately should the brace result in increased pain, decreased sensation, increased swelling or worsening of the condition.

## 2022-10-12 ENCOUNTER — OFFICE VISIT (OUTPATIENT)
Dept: ORTHOPEDIC SURGERY | Age: 72
End: 2022-10-12

## 2022-10-12 VITALS — BODY MASS INDEX: 28.59 KG/M2 | HEIGHT: 71 IN | RESPIRATION RATE: 16 BRPM

## 2022-10-12 DIAGNOSIS — S62.115A CLOSED NONDISPLACED FRACTURE OF TRIQUETRUM OF LEFT WRIST, INITIAL ENCOUNTER: Primary | ICD-10-CM

## 2022-10-12 PROCEDURE — 99024 POSTOP FOLLOW-UP VISIT: CPT | Performed by: ORTHOPAEDIC SURGERY

## 2022-10-12 NOTE — PROGRESS NOTES
The patient has had no difficulties and voices no complaints. The patient's social history, past medical history, family history, medications, allergies and review of systems have been reviewed, dated 9/28/22 and are recorded in the chart. The brace is removed. Skin is in good condition. There is  minimal  swelling. There is no significant deformity and mild tenderness is noted over the fracture site. Range of motion is mildly limited. Xrays: AP, lateral and oblique Xrays of the left wrist, done in the office today, demonstrate progressive healing of the fracture in satisfactory position. .     The patient is fully advised regarding activities, precautions, intermittent brace use and a home program of range of motion exercises, which is fully discussed and demonstrated. The usual course of events in the resolution of the symptoms associated with this condition is fully discussed with the patient. As long as they progress as expected, they do not need to return for further follow up. They are, however, urged to call or return if they have questions or concerns or if full painless function of their wrist has not returned by 3 weeks from today.

## 2023-04-12 PROBLEM — M65.30 TRIGGER FINGER, ACQUIRED: Status: ACTIVE | Noted: 2019-08-20

## 2024-03-13 ENCOUNTER — OFFICE VISIT (OUTPATIENT)
Dept: ORTHOPEDIC SURGERY | Age: 74
End: 2024-03-13

## 2024-03-13 VITALS — RESPIRATION RATE: 14 BRPM | WEIGHT: 205 LBS | HEIGHT: 71 IN | BODY MASS INDEX: 28.7 KG/M2

## 2024-03-13 DIAGNOSIS — M65.30 TRIGGER FINGER, ACQUIRED: Primary | ICD-10-CM

## 2024-03-13 RX ORDER — LIDOCAINE HYDROCHLORIDE 10 MG/ML
0.5 INJECTION, SOLUTION INFILTRATION; PERINEURAL ONCE
Status: COMPLETED | OUTPATIENT
Start: 2024-03-13 | End: 2024-03-13

## 2024-03-13 RX ORDER — TRIAMCINOLONE ACETONIDE 40 MG/ML
20 INJECTION, SUSPENSION INTRA-ARTICULAR; INTRAMUSCULAR ONCE
Status: COMPLETED | OUTPATIENT
Start: 2024-03-13 | End: 2024-03-13

## 2024-03-13 RX ADMIN — LIDOCAINE HYDROCHLORIDE 0.5 ML: 10 INJECTION, SOLUTION INFILTRATION; PERINEURAL at 11:55

## 2024-03-13 RX ADMIN — TRIAMCINOLONE ACETONIDE 20 MG: 40 INJECTION, SUSPENSION INTRA-ARTICULAR; INTRAMUSCULAR at 11:56

## 2024-03-13 NOTE — PROGRESS NOTES
I last examined this patient 11 months ago at which time I injected the right middle finger for treatment of trigger finger. He obtained prompt and complete relief of all symptoms.  Unfortunately, the condition has recurred in that finger and recently started in his left index finger and the patient returns to the office requesting additional treatment.  He complains of pain, swelling, catching and stiffness of the digit for the past few weeks.  Symptoms have become worse recently.    The patient's social history, past medical history, family history, medications, allergies and review of systems have been reviewed, dated 3/13/24 and are recorded in the chart.    I have personally examined and reviewed all previous imaging studies, laboratory tests and medical encounters pertinent to this patient's visit today.     On examination there is mild soft tissue swelling of these digits.  There is no deformity. There is tenderness, thickening and nodularity at the base of the flexor tendon sheaths.  Range of motion is slightly limited in flexion and extension.  The digits stick in flexion and pop into extension, accompanied by some pain. Skin is intact without lesions.  Distal circulation and sensation are intact.  Muscle strength and coordination are normal.  Reflexes and present bilaterally.  Joints are stable.  There are no subcutaneous nodules or enlarged epitrochlear lymph nodes.     Impression:  Left index finger trigger digit.                       Recurrent right middle finger trigger digit.     The nature of this medical problem is fully discussed with the patient, including all treatment options, as well as the pertinent risks, complications, prognosis and post treatment care. All questions are answered. The bilateral  hands are prepared with Betadine and alcohol and the flexor tendon sheath of the left index finger and right middle finger are each injected with 1/2 milliliter of 1% lidocaine and 20 mg.of

## 2024-06-26 ENCOUNTER — HOSPITAL ENCOUNTER (OUTPATIENT)
Age: 74
Discharge: HOME | End: 2024-06-26
Payer: COMMERCIAL

## 2024-06-26 ENCOUNTER — HOSPITAL ENCOUNTER (OUTPATIENT)
Dept: GENERAL RADIOLOGY | Age: 74
Discharge: HOME | End: 2024-06-26
Payer: COMMERCIAL

## 2024-06-26 DIAGNOSIS — S73.121A: ICD-10-CM

## 2024-06-26 PROCEDURE — 73502 X-RAY EXAM HIP UNI 2-3 VIEWS: CPT

## 2024-11-14 ENCOUNTER — HOSPITAL ENCOUNTER (EMERGENCY)
Age: 74
Discharge: HOME | End: 2024-11-14
Attending: EMERGENCY MEDICINE
Payer: OTHER GOVERNMENT

## 2024-11-14 VITALS
WEIGHT: 210 LBS | HEIGHT: 71 IN | RESPIRATION RATE: 18 BRPM | DIASTOLIC BLOOD PRESSURE: 82 MMHG | BODY MASS INDEX: 29.4 KG/M2 | HEART RATE: 64 BPM | SYSTOLIC BLOOD PRESSURE: 195 MMHG | TEMPERATURE: 97.6 F | OXYGEN SATURATION: 97 %

## 2024-11-14 DIAGNOSIS — T15.92XA FOREIGN BODY IN EYE, LEFT, INITIAL ENCOUNTER: Primary | ICD-10-CM

## 2024-11-14 PROCEDURE — 99283 EMERGENCY DEPT VISIT LOW MDM: CPT

## 2024-11-14 RX ORDER — ERYTHROMYCIN 5 MG/G
OINTMENT OPHTHALMIC
Qty: 1 G | Refills: 0 | Status: SHIPPED | OUTPATIENT
Start: 2024-11-14 | End: 2024-11-24

## 2024-11-14 RX ORDER — TETRACAINE HYDROCHLORIDE 5 MG/ML
1 SOLUTION OPHTHALMIC ONCE
Status: DISCONTINUED | OUTPATIENT
Start: 2024-11-14 | End: 2024-11-14 | Stop reason: HOSPADM

## 2024-11-14 ASSESSMENT — PAIN DESCRIPTION - LOCATION: LOCATION: BACK;LEG

## 2024-11-14 ASSESSMENT — PAIN - FUNCTIONAL ASSESSMENT: PAIN_FUNCTIONAL_ASSESSMENT: 0-10

## 2024-11-14 ASSESSMENT — VISUAL ACUITY
OD: 20/30
OS: 20/40

## 2024-11-14 ASSESSMENT — LIFESTYLE VARIABLES
HOW MANY STANDARD DRINKS CONTAINING ALCOHOL DO YOU HAVE ON A TYPICAL DAY: PATIENT DOES NOT DRINK
HOW OFTEN DO YOU HAVE A DRINK CONTAINING ALCOHOL: NEVER

## 2024-11-14 ASSESSMENT — PAIN SCALES - GENERAL: PAINLEVEL_OUTOF10: 4

## 2024-11-14 ASSESSMENT — PAIN DESCRIPTION - DESCRIPTORS: DESCRIPTORS: DISCOMFORT

## 2024-11-14 NOTE — ED PROVIDER NOTES
Oriented  Best Motor Response: Obeys commands  Ngoc Coma Scale Score: 15             Patient Referrals:  [facility]  [address]  [facility] [id number] [phone]    Go in 1 day      Josh Whitney MD  [address]  [facility] [address]    Schedule an appointment as soon as possible for a visit       [facility]  [address]  [place] [address]  [phone]  Go to   If symptoms worsen      Discharge Medications:  Discharge Medication List as of 11/14/2024  4:14 PM        START taking these medications    Details   erythromycin (ROMYCIN) 5 MG/GM ophthalmic ointment Four times daily to affected eye x 5 days, Disp-1 g, R-0, Normal             FINAL IMPRESSION  1. Foreign body in eye, left, initial encounter        Blood pressure (!) 195/82, pulse 64, temperature 97.6 °F (36.4 °C), temperature source Oral, resp. rate 18, height 1.803 m (5' 11\"), weight 95.3 kg (210 lb), SpO2 97%.     For further details of Ok Bass's emergency department encounter, please see documentation by advanced practice provider.        Paresh Diana MD  11/16/24 3941    
Physician in the absence of a cardiologist.  Please see their note for interpretation of EKG.    RADIOLOGY:   Non-plain film images such as CT, Ultrasound and MRI are read by the radiologist. Plain radiographic images are visualized and preliminarily interpreted by the ED Provider with the below findings:      Interpretation per the Radiologist below, if available at the time of this note:    No orders to display     No results found.     No results found.    PROCEDURES   Unless otherwise noted below, none     Procedures    CRITICAL CARE TIME (.cctime)         EMERGENCY DEPARTMENT COURSE and DIFFERENTIAL DIAGNOSIS/MDM:   Vitals:    Vitals:    11/14/24 1329   BP: (!) 195/82   Pulse: 64   Resp: 18   Temp: 97.6 °F (36.4 °C)   TempSrc: Oral   SpO2: 97%   Weight: 95.3 kg (210 lb)   Height: 1.803 m (5' 11\")       Patient was given the following medications:  Medications - No data to display          Is this patient to be included in the SEP-1 Core Measure due to severe sepsis or septic shock?   No   Exclusion criteria - the patient is NOT to be included for SEP-1 Core Measure due to:  Infection is not suspected      Chronic Conditions affecting care:    has a past medical history of Anxiety, Arthritis, Back pain, chronic, Chronic neck pain, Diabetes mellitus (HCC), Glaucoma, Hypercholesteremia, Hypertension, and Stool culture positive for Clostridium difficile (01/10/2014).    CONSULTS: (Who and What was discussed)  None      Records Reviewed (External and Source)   ED visit 7/1/2020 patient presented for hypotension    CC/HPI Summary, DDx, ED Course, and Reassessment:   This is a 74-year-old male who presents emergency department for left eye injury with piece of metal in his eye.  Physical exam as above.  On arrival he is in no acute distress, afebrile, hemodynamically stable.  Tetanus is up-to-date.  Woods lamp exam with fluorescein staining showing foreign body without corneal ulceration or abrasions.  I attempted to

## 2024-11-14 NOTE — DISCHARGE INSTRUCTIONS
Please follow-up with your eye doctor no later than tomorrow for repeat dilation.  Return this department if you develop any new or worsening symptoms.